# Patient Record
Sex: FEMALE | Race: WHITE | URBAN - METROPOLITAN AREA
[De-identification: names, ages, dates, MRNs, and addresses within clinical notes are randomized per-mention and may not be internally consistent; named-entity substitution may affect disease eponyms.]

---

## 2017-03-31 ENCOUNTER — APPOINTMENT (RX ONLY)
Dept: URBAN - METROPOLITAN AREA CLINIC 29 | Facility: CLINIC | Age: 52
Setting detail: DERMATOLOGY
End: 2017-03-31

## 2017-03-31 DIAGNOSIS — Q826 OTHER SPECIFIED ANOMALIES OF SKIN: ICD-10-CM

## 2017-03-31 DIAGNOSIS — Q828 OTHER SPECIFIED ANOMALIES OF SKIN: ICD-10-CM

## 2017-03-31 DIAGNOSIS — Q819 OTHER SPECIFIED ANOMALIES OF SKIN: ICD-10-CM

## 2017-03-31 DIAGNOSIS — Z71.89 OTHER SPECIFIED COUNSELING: ICD-10-CM

## 2017-03-31 DIAGNOSIS — L71.8 OTHER ROSACEA: ICD-10-CM

## 2017-03-31 DIAGNOSIS — L81.4 OTHER MELANIN HYPERPIGMENTATION: ICD-10-CM

## 2017-03-31 PROBLEM — F32.9 MAJOR DEPRESSIVE DISORDER, SINGLE EPISODE, UNSPECIFIED: Status: ACTIVE | Noted: 2017-03-31

## 2017-03-31 PROBLEM — F41.9 ANXIETY DISORDER, UNSPECIFIED: Status: ACTIVE | Noted: 2017-03-31

## 2017-03-31 PROBLEM — Q82.8 OTHER SPECIFIED CONGENITAL MALFORMATIONS OF SKIN: Status: ACTIVE | Noted: 2017-03-31

## 2017-03-31 PROBLEM — L55.1 SUNBURN OF SECOND DEGREE: Status: ACTIVE | Noted: 2017-03-31

## 2017-03-31 PROCEDURE — 99203 OFFICE O/P NEW LOW 30 MIN: CPT

## 2017-03-31 PROCEDURE — ? TREATMENT REGIMEN

## 2017-03-31 PROCEDURE — ? PRESCRIPTION

## 2017-03-31 PROCEDURE — ? COUNSELING

## 2017-03-31 RX ORDER — SULFACETAMIDE SODIUM 10 MG/ML
1 LOTION TOPICAL BID
Qty: 1 | Refills: 3 | Status: ERX | COMMUNITY
Start: 2017-03-31

## 2017-03-31 RX ADMIN — SULFACETAMIDE SODIUM 1: 10 LOTION TOPICAL at 00:00

## 2017-03-31 ASSESSMENT — LOCATION SIMPLE DESCRIPTION DERM
LOCATION SIMPLE: LEFT HAND
LOCATION SIMPLE: RIGHT CHEEK
LOCATION SIMPLE: RIGHT HAND
LOCATION SIMPLE: RIGHT POSTERIOR UPPER ARM
LOCATION SIMPLE: LEFT POSTERIOR UPPER ARM
LOCATION SIMPLE: LEFT CHEEK

## 2017-03-31 ASSESSMENT — LOCATION DETAILED DESCRIPTION DERM
LOCATION DETAILED: RIGHT DISTAL POSTERIOR UPPER ARM
LOCATION DETAILED: RIGHT MEDIAL MALAR CHEEK
LOCATION DETAILED: LEFT INFERIOR MEDIAL MALAR CHEEK
LOCATION DETAILED: RIGHT RADIAL DORSAL HAND
LOCATION DETAILED: LEFT PROXIMAL POSTERIOR UPPER ARM
LOCATION DETAILED: LEFT RADIAL DORSAL HAND

## 2017-03-31 ASSESSMENT — LOCATION ZONE DERM
LOCATION ZONE: FACE
LOCATION ZONE: ARM
LOCATION ZONE: HAND

## 2017-06-15 ENCOUNTER — APPOINTMENT (RX ONLY)
Dept: URBAN - METROPOLITAN AREA CLINIC 29 | Facility: CLINIC | Age: 52
Setting detail: DERMATOLOGY
End: 2017-06-15

## 2017-06-15 DIAGNOSIS — L30.8 OTHER SPECIFIED DERMATITIS: ICD-10-CM

## 2017-06-15 DIAGNOSIS — L71.8 OTHER ROSACEA: ICD-10-CM

## 2017-06-15 DIAGNOSIS — L44.8 OTHER SPECIFIED PAPULOSQUAMOUS DISORDERS: ICD-10-CM

## 2017-06-15 DIAGNOSIS — L81.0 POSTINFLAMMATORY HYPERPIGMENTATION: ICD-10-CM

## 2017-06-15 PROCEDURE — ? PRESCRIPTION

## 2017-06-15 PROCEDURE — ? OTHER

## 2017-06-15 PROCEDURE — ? COUNSELING

## 2017-06-15 PROCEDURE — 99213 OFFICE O/P EST LOW 20 MIN: CPT

## 2017-06-15 RX ORDER — HYDROCORTISONE 25 MG/G
1 CREAM TOPICAL BID
Qty: 1 | Refills: 3 | Status: ERX | COMMUNITY
Start: 2017-06-15

## 2017-06-15 RX ORDER — AZELAIC ACID 0.15 G/G
1 GEL TOPICAL QHS
Qty: 1 | Refills: 3 | Status: ERX | COMMUNITY
Start: 2017-06-15

## 2017-06-15 RX ADMIN — AZELAIC ACID 1: 0.15 GEL TOPICAL at 14:05

## 2017-06-15 RX ADMIN — HYDROCORTISONE 1: 25 CREAM TOPICAL at 14:00

## 2017-06-15 ASSESSMENT — LOCATION SIMPLE DESCRIPTION DERM
LOCATION SIMPLE: RIGHT KNEE
LOCATION SIMPLE: RIGHT CHEEK
LOCATION SIMPLE: LEFT KNEE
LOCATION SIMPLE: LEFT CHEEK
LOCATION SIMPLE: LEFT POSTERIOR UPPER ARM

## 2017-06-15 ASSESSMENT — LOCATION ZONE DERM
LOCATION ZONE: FACE
LOCATION ZONE: LEG
LOCATION ZONE: ARM

## 2017-06-15 ASSESSMENT — LOCATION DETAILED DESCRIPTION DERM
LOCATION DETAILED: LEFT INFERIOR MEDIAL MALAR CHEEK
LOCATION DETAILED: LEFT DISTAL POSTERIOR UPPER ARM
LOCATION DETAILED: LEFT INFERIOR CENTRAL MALAR CHEEK
LOCATION DETAILED: RIGHT CENTRAL MALAR CHEEK
LOCATION DETAILED: RIGHT KNEE
LOCATION DETAILED: LEFT KNEE

## 2017-06-15 NOTE — PROCEDURE: OTHER
Detail Level: Zone
Note Text (......Xxx Chief Complaint.): This diagnosis correlates with the
Other (Free Text): Advised patient to take a photo if area becomes irritated

## 2018-04-28 ENCOUNTER — HOSPITAL ENCOUNTER (OUTPATIENT)
Facility: MEDICAL CENTER | Age: 53
End: 2018-04-29
Attending: EMERGENCY MEDICINE | Admitting: INTERNAL MEDICINE
Payer: OTHER GOVERNMENT

## 2018-04-28 ENCOUNTER — APPOINTMENT (OUTPATIENT)
Dept: RADIOLOGY | Facility: MEDICAL CENTER | Age: 53
End: 2018-04-28
Attending: EMERGENCY MEDICINE
Payer: OTHER GOVERNMENT

## 2018-04-28 DIAGNOSIS — R47.01 EXPRESSIVE APHASIA: ICD-10-CM

## 2018-04-28 PROBLEM — G45.9 TIA (TRANSIENT ISCHEMIC ATTACK): Status: ACTIVE | Noted: 2018-04-28

## 2018-04-28 PROBLEM — E78.5 HLD (HYPERLIPIDEMIA): Status: ACTIVE | Noted: 2018-04-28

## 2018-04-28 PROBLEM — Z72.0 NICOTINE ABUSE: Status: ACTIVE | Noted: 2018-04-28

## 2018-04-28 PROBLEM — F32.A DEPRESSION: Status: ACTIVE | Noted: 2018-04-28

## 2018-04-28 LAB
ALBUMIN SERPL BCP-MCNC: 4.4 G/DL (ref 3.2–4.9)
ALBUMIN/GLOB SERPL: 1.3 G/DL
ALP SERPL-CCNC: 51 U/L (ref 30–99)
ALT SERPL-CCNC: 9 U/L (ref 2–50)
ANION GAP SERPL CALC-SCNC: 9 MMOL/L (ref 0–11.9)
APTT PPP: 24.6 SEC (ref 24.7–36)
AST SERPL-CCNC: 14 U/L (ref 12–45)
BASOPHILS # BLD AUTO: 0.4 % (ref 0–1.8)
BASOPHILS # BLD: 0.03 K/UL (ref 0–0.12)
BILIRUB SERPL-MCNC: 0.5 MG/DL (ref 0.1–1.5)
BUN SERPL-MCNC: 13 MG/DL (ref 8–22)
CALCIUM SERPL-MCNC: 9.4 MG/DL (ref 8.5–10.5)
CHLORIDE SERPL-SCNC: 101 MMOL/L (ref 96–112)
CHOLEST SERPL-MCNC: 249 MG/DL (ref 100–199)
CO2 SERPL-SCNC: 26 MMOL/L (ref 20–33)
CREAT SERPL-MCNC: 0.82 MG/DL (ref 0.5–1.4)
EKG IMPRESSION: NORMAL
EOSINOPHIL # BLD AUTO: 0.04 K/UL (ref 0–0.51)
EOSINOPHIL NFR BLD: 0.5 % (ref 0–6.9)
ERYTHROCYTE [DISTWIDTH] IN BLOOD BY AUTOMATED COUNT: 40.4 FL (ref 35.9–50)
GLOBULIN SER CALC-MCNC: 3.3 G/DL (ref 1.9–3.5)
GLUCOSE SERPL-MCNC: 126 MG/DL (ref 65–99)
HCT VFR BLD AUTO: 42.6 % (ref 37–47)
HDLC SERPL-MCNC: 91 MG/DL
HGB BLD-MCNC: 14.8 G/DL (ref 12–16)
IMM GRANULOCYTES # BLD AUTO: 0.02 K/UL (ref 0–0.11)
IMM GRANULOCYTES NFR BLD AUTO: 0.3 % (ref 0–0.9)
INR PPP: 0.92 (ref 0.87–1.13)
LDLC SERPL CALC-MCNC: 144 MG/DL
LYMPHOCYTES # BLD AUTO: 2.07 K/UL (ref 1–4.8)
LYMPHOCYTES NFR BLD: 28.2 % (ref 22–41)
MCH RBC QN AUTO: 32 PG (ref 27–33)
MCHC RBC AUTO-ENTMCNC: 34.7 G/DL (ref 33.6–35)
MCV RBC AUTO: 92 FL (ref 81.4–97.8)
MONOCYTES # BLD AUTO: 0.43 K/UL (ref 0–0.85)
MONOCYTES NFR BLD AUTO: 5.9 % (ref 0–13.4)
NEUTROPHILS # BLD AUTO: 4.74 K/UL (ref 2–7.15)
NEUTROPHILS NFR BLD: 64.7 % (ref 44–72)
NRBC # BLD AUTO: 0 K/UL
NRBC BLD-RTO: 0 /100 WBC
PLATELET # BLD AUTO: 277 K/UL (ref 164–446)
PMV BLD AUTO: 10 FL (ref 9–12.9)
POTASSIUM SERPL-SCNC: 3.5 MMOL/L (ref 3.6–5.5)
PROT SERPL-MCNC: 7.7 G/DL (ref 6–8.2)
PROTHROMBIN TIME: 12.1 SEC (ref 12–14.6)
RBC # BLD AUTO: 4.63 M/UL (ref 4.2–5.4)
SODIUM SERPL-SCNC: 136 MMOL/L (ref 135–145)
TRIGL SERPL-MCNC: 72 MG/DL (ref 0–149)
TROPONIN I SERPL-MCNC: <0.01 NG/ML (ref 0–0.04)
TSH SERPL DL<=0.005 MIU/L-ACNC: 1.64 UIU/ML (ref 0.38–5.33)
WBC # BLD AUTO: 7.3 K/UL (ref 4.8–10.8)

## 2018-04-28 PROCEDURE — G0378 HOSPITAL OBSERVATION PER HR: HCPCS

## 2018-04-28 PROCEDURE — 70496 CT ANGIOGRAPHY HEAD: CPT

## 2018-04-28 PROCEDURE — 93306 TTE W/DOPPLER COMPLETE: CPT | Performed by: INTERNAL MEDICINE

## 2018-04-28 PROCEDURE — 99285 EMERGENCY DEPT VISIT HI MDM: CPT

## 2018-04-28 PROCEDURE — 80061 LIPID PANEL: CPT

## 2018-04-28 PROCEDURE — 84484 ASSAY OF TROPONIN QUANT: CPT

## 2018-04-28 PROCEDURE — 70498 CT ANGIOGRAPHY NECK: CPT

## 2018-04-28 PROCEDURE — 85730 THROMBOPLASTIN TIME PARTIAL: CPT

## 2018-04-28 PROCEDURE — 99220 PR INITIAL OBSERVATION CARE,LEVL III: CPT | Performed by: INTERNAL MEDICINE

## 2018-04-28 PROCEDURE — 0042T CT-CEREBRAL PERFUSION ANALYSIS: CPT

## 2018-04-28 PROCEDURE — 93005 ELECTROCARDIOGRAM TRACING: CPT | Performed by: EMERGENCY MEDICINE

## 2018-04-28 PROCEDURE — 83036 HEMOGLOBIN GLYCOSYLATED A1C: CPT

## 2018-04-28 PROCEDURE — 85610 PROTHROMBIN TIME: CPT

## 2018-04-28 PROCEDURE — 70450 CT HEAD/BRAIN W/O DYE: CPT

## 2018-04-28 PROCEDURE — 700117 HCHG RX CONTRAST REV CODE 255: Performed by: EMERGENCY MEDICINE

## 2018-04-28 PROCEDURE — 85025 COMPLETE CBC W/AUTO DIFF WBC: CPT

## 2018-04-28 PROCEDURE — 80053 COMPREHEN METABOLIC PANEL: CPT

## 2018-04-28 PROCEDURE — 84443 ASSAY THYROID STIM HORMONE: CPT

## 2018-04-28 RX ORDER — ACETAMINOPHEN 325 MG/1
650 TABLET ORAL EVERY 6 HOURS PRN
Status: DISCONTINUED | OUTPATIENT
Start: 2018-04-28 | End: 2018-04-29 | Stop reason: HOSPADM

## 2018-04-28 RX ORDER — ASPIRIN 325 MG
325 TABLET ORAL DAILY
Status: DISCONTINUED | OUTPATIENT
Start: 2018-04-28 | End: 2018-04-29 | Stop reason: HOSPADM

## 2018-04-28 RX ORDER — POLYETHYLENE GLYCOL 3350 17 G/17G
1 POWDER, FOR SOLUTION ORAL
Status: DISCONTINUED | OUTPATIENT
Start: 2018-04-28 | End: 2018-04-29 | Stop reason: HOSPADM

## 2018-04-28 RX ORDER — PROMETHAZINE HYDROCHLORIDE 25 MG/1
12.5-25 SUPPOSITORY RECTAL EVERY 4 HOURS PRN
Status: DISCONTINUED | OUTPATIENT
Start: 2018-04-28 | End: 2018-04-29 | Stop reason: HOSPADM

## 2018-04-28 RX ORDER — ASPIRIN 300 MG/1
300 SUPPOSITORY RECTAL DAILY
Status: DISCONTINUED | OUTPATIENT
Start: 2018-04-28 | End: 2018-04-29 | Stop reason: HOSPADM

## 2018-04-28 RX ORDER — PROMETHAZINE HYDROCHLORIDE 25 MG/1
12.5-25 TABLET ORAL EVERY 4 HOURS PRN
Status: DISCONTINUED | OUTPATIENT
Start: 2018-04-28 | End: 2018-04-29 | Stop reason: HOSPADM

## 2018-04-28 RX ORDER — ONDANSETRON 2 MG/ML
4 INJECTION INTRAMUSCULAR; INTRAVENOUS EVERY 4 HOURS PRN
Status: DISCONTINUED | OUTPATIENT
Start: 2018-04-28 | End: 2018-04-29 | Stop reason: HOSPADM

## 2018-04-28 RX ORDER — ONDANSETRON 4 MG/1
4 TABLET, ORALLY DISINTEGRATING ORAL EVERY 4 HOURS PRN
Status: DISCONTINUED | OUTPATIENT
Start: 2018-04-28 | End: 2018-04-29 | Stop reason: HOSPADM

## 2018-04-28 RX ORDER — BISACODYL 10 MG
10 SUPPOSITORY, RECTAL RECTAL
Status: DISCONTINUED | OUTPATIENT
Start: 2018-04-28 | End: 2018-04-29 | Stop reason: HOSPADM

## 2018-04-28 RX ORDER — MONTELUKAST SODIUM 4 MG/1
2 TABLET, CHEWABLE ORAL EVERY EVENING
COMMUNITY

## 2018-04-28 RX ORDER — HYDRALAZINE HYDROCHLORIDE 20 MG/ML
10 INJECTION INTRAMUSCULAR; INTRAVENOUS
Status: DISCONTINUED | OUTPATIENT
Start: 2018-04-28 | End: 2018-04-29 | Stop reason: HOSPADM

## 2018-04-28 RX ORDER — ESCITALOPRAM OXALATE 10 MG/1
5 TABLET ORAL EVERY EVENING
COMMUNITY

## 2018-04-28 RX ORDER — ALPRAZOLAM 0.5 MG/1
0.5 TABLET ORAL NIGHTLY PRN
COMMUNITY

## 2018-04-28 RX ORDER — AMOXICILLIN 250 MG
2 CAPSULE ORAL 2 TIMES DAILY
Status: DISCONTINUED | OUTPATIENT
Start: 2018-04-28 | End: 2018-04-29 | Stop reason: HOSPADM

## 2018-04-28 RX ORDER — FEXOFENADINE HCL 60 MG/1
120 TABLET, FILM COATED ORAL 2 TIMES DAILY
COMMUNITY

## 2018-04-28 RX ORDER — ATORVASTATIN CALCIUM 80 MG/1
80 TABLET, FILM COATED ORAL EVERY EVENING
Status: DISCONTINUED | OUTPATIENT
Start: 2018-04-28 | End: 2018-04-29 | Stop reason: HOSPADM

## 2018-04-28 RX ORDER — SULFAMETHOXAZOLE AND TRIMETHOPRIM 800; 160 MG/1; MG/1
1 TABLET ORAL 2 TIMES DAILY
COMMUNITY

## 2018-04-28 RX ORDER — ASPIRIN 81 MG/1
324 TABLET, CHEWABLE ORAL DAILY
Status: DISCONTINUED | OUTPATIENT
Start: 2018-04-28 | End: 2018-04-29 | Stop reason: HOSPADM

## 2018-04-28 RX ORDER — LABETALOL HYDROCHLORIDE 5 MG/ML
10 INJECTION, SOLUTION INTRAVENOUS EVERY 4 HOURS PRN
Status: DISCONTINUED | OUTPATIENT
Start: 2018-04-28 | End: 2018-04-29 | Stop reason: HOSPADM

## 2018-04-28 RX ADMIN — IOHEXOL 100 ML: 350 INJECTION, SOLUTION INTRAVENOUS at 21:40

## 2018-04-28 ASSESSMENT — LIFESTYLE VARIABLES
ON A TYPICAL DAY WHEN YOU DRINK ALCOHOL HOW MANY DRINKS DO YOU HAVE: 1
EVER HAD A DRINK FIRST THING IN THE MORNING TO STEADY YOUR NERVES TO GET RID OF A HANGOVER: NO
CONSUMPTION TOTAL: NEGATIVE
AVERAGE NUMBER OF DAYS PER WEEK YOU HAVE A DRINK CONTAINING ALCOHOL: 2
DO YOU DRINK ALCOHOL: YES
EVER FELT BAD OR GUILTY ABOUT YOUR DRINKING: NO
TOTAL SCORE: 0
HOW MANY TIMES IN THE PAST YEAR HAVE YOU HAD 5 OR MORE DRINKS IN A DAY: 0
HAVE YOU EVER FELT YOU SHOULD CUT DOWN ON YOUR DRINKING: NO
TOTAL SCORE: 0
EVER_SMOKED: YES
TOTAL SCORE: 0
HAVE PEOPLE ANNOYED YOU BY CRITICIZING YOUR DRINKING: NO

## 2018-04-28 ASSESSMENT — COPD QUESTIONNAIRES
COPD SCREENING SCORE: 3
HAVE YOU SMOKED AT LEAST 100 CIGARETTES IN YOUR ENTIRE LIFE: YES
DO YOU EVER COUGH UP ANY MUCUS OR PHLEGM?: NO/ONLY WITH OCCASIONAL COLDS OR INFECTIONS
DURING THE PAST 4 WEEKS HOW MUCH DID YOU FEEL SHORT OF BREATH: NONE/LITTLE OF THE TIME

## 2018-04-28 ASSESSMENT — ENCOUNTER SYMPTOMS
HEADACHES: 1
COUGH: 0
HEARTBURN: 0
NAUSEA: 0
LOSS OF CONSCIOUSNESS: 0
VOMITING: 0
CHILLS: 0
DIZZINESS: 0
SENSORY CHANGE: 0
FOCAL WEAKNESS: 0
FEVER: 0
SPEECH CHANGE: 1
SHORTNESS OF BREATH: 0

## 2018-04-29 ENCOUNTER — APPOINTMENT (OUTPATIENT)
Dept: RADIOLOGY | Facility: MEDICAL CENTER | Age: 53
End: 2018-04-29
Attending: INTERNAL MEDICINE
Payer: OTHER GOVERNMENT

## 2018-04-29 VITALS
HEIGHT: 62 IN | OXYGEN SATURATION: 99 % | BODY MASS INDEX: 21.1 KG/M2 | RESPIRATION RATE: 13 BRPM | SYSTOLIC BLOOD PRESSURE: 103 MMHG | DIASTOLIC BLOOD PRESSURE: 60 MMHG | TEMPERATURE: 99 F | WEIGHT: 114.64 LBS | HEART RATE: 70 BPM

## 2018-04-29 PROBLEM — G45.9 TIA (TRANSIENT ISCHEMIC ATTACK): Status: RESOLVED | Noted: 2018-04-28 | Resolved: 2018-04-29

## 2018-04-29 LAB
EST. AVERAGE GLUCOSE BLD GHB EST-MCNC: 97 MG/DL
HBA1C MFR BLD: 5 % (ref 0–5.6)
LV EJECT FRACT  99904: 60
LV EJECT FRACT MOD 2C 99903: 67.83
LV EJECT FRACT MOD 4C 99902: 65.82
LV EJECT FRACT MOD BP 99901: 63.11

## 2018-04-29 PROCEDURE — 70551 MRI BRAIN STEM W/O DYE: CPT

## 2018-04-29 PROCEDURE — 93306 TTE W/DOPPLER COMPLETE: CPT

## 2018-04-29 PROCEDURE — G8980 MOBILITY D/C STATUS: HCPCS | Mod: CH

## 2018-04-29 PROCEDURE — 306588 SLEEVE,VASO CALF MED: Performed by: INTERNAL MEDICINE

## 2018-04-29 PROCEDURE — G0378 HOSPITAL OBSERVATION PER HR: HCPCS

## 2018-04-29 PROCEDURE — A9270 NON-COVERED ITEM OR SERVICE: HCPCS | Performed by: INTERNAL MEDICINE

## 2018-04-29 PROCEDURE — G8979 MOBILITY GOAL STATUS: HCPCS | Mod: CH

## 2018-04-29 PROCEDURE — 99217 PR OBSERVATION CARE DISCHARGE: CPT | Performed by: HOSPITALIST

## 2018-04-29 PROCEDURE — 700102 HCHG RX REV CODE 250 W/ 637 OVERRIDE(OP): Performed by: INTERNAL MEDICINE

## 2018-04-29 PROCEDURE — G8978 MOBILITY CURRENT STATUS: HCPCS | Mod: CH

## 2018-04-29 PROCEDURE — 97161 PT EVAL LOW COMPLEX 20 MIN: CPT

## 2018-04-29 RX ADMIN — ATORVASTATIN CALCIUM 80 MG: 80 TABLET, FILM COATED ORAL at 01:45

## 2018-04-29 RX ADMIN — ASPIRIN 81 MG: 81 TABLET, CHEWABLE ORAL at 02:42

## 2018-04-29 RX ADMIN — ASPIRIN 243 MG: 81 TABLET, CHEWABLE ORAL at 01:45

## 2018-04-29 ASSESSMENT — PAIN SCALES - GENERAL
PAINLEVEL_OUTOF10: 0

## 2018-04-29 ASSESSMENT — GAIT ASSESSMENTS
GAIT LEVEL OF ASSIST: SUPERVISED
DISTANCE (FEET): 500

## 2018-04-29 ASSESSMENT — LIFESTYLE VARIABLES
TOTAL SCORE: 0
HAVE PEOPLE ANNOYED YOU BY CRITICIZING YOUR DRINKING: NO
AVERAGE NUMBER OF DAYS PER WEEK YOU HAVE A DRINK CONTAINING ALCOHOL: 1
EVER FELT BAD OR GUILTY ABOUT YOUR DRINKING: NO
TOTAL SCORE: 0
HAVE YOU EVER FELT YOU SHOULD CUT DOWN ON YOUR DRINKING: NO
TOTAL SCORE: 0
EVER HAD A DRINK FIRST THING IN THE MORNING TO STEADY YOUR NERVES TO GET RID OF A HANGOVER: NO
ON A TYPICAL DAY WHEN YOU DRINK ALCOHOL HOW MANY DRINKS DO YOU HAVE: 2
CONSUMPTION TOTAL: NEGATIVE
HOW MANY TIMES IN THE PAST YEAR HAVE YOU HAD 5 OR MORE DRINKS IN A DAY: 0

## 2018-04-29 ASSESSMENT — COGNITIVE AND FUNCTIONAL STATUS - GENERAL
SUGGESTED CMS G CODE MODIFIER MOBILITY: CH
MOBILITY SCORE: 24

## 2018-04-29 ASSESSMENT — PATIENT HEALTH QUESTIONNAIRE - PHQ9
2. FEELING DOWN, DEPRESSED, IRRITABLE, OR HOPELESS: NOT AT ALL
SUM OF ALL RESPONSES TO PHQ9 QUESTIONS 1 AND 2: 0
1. LITTLE INTEREST OR PLEASURE IN DOING THINGS: NOT AT ALL

## 2018-04-29 NOTE — PROGRESS NOTES
IV Dc 'd. Discharge instructions provided to patient. Pt verbalizes understanding. Pt states all questions have been answered. Copy of DC provided to patient. Signed copy in chart. Pt states all personal belongings are in possession.  Pt escorted off unit by tech without incident.

## 2018-04-29 NOTE — ED PROVIDER NOTES
ED Provider Note    CHIEF COMPLAINT  Chief Complaint   Patient presents with   • Inability To Get Words Out     Pt. states that approximately 30 minutes ago she began to have difficulty formulating sentences and having word formation. Pt. is talking in full and complete sentences in triage. Pt. has equal strength and  bilaterally in triage. Pt. is anxious in triage. Pt. has 3mm oval and reactive right eye and 2mm oval and reactive left eye. Pt. states family hx of brain cancer with similar symptoms occuring in family members prior to dx.        HPI  Elise Pozo is a 52 y.o. female who presents for evaluation of difficulty speaking of acute onset at 7:30 PM, proximal and one hour prior to evaluation here in the emergency department. The patient reports difficulty finding an formulating words, she states she is having difficulty putting her thoughts into words and is aware of what's happening. The family reports that her symptoms at various times over the past hour were very severe, at this time they seem much less severe. She has no weakness, numbness or tingling of any extremity, no headache but feels a bizarre sensation behind her left eye. No visual changes. No chest pain, no back pain, no other complaints, no medical problems.    REVIEW OF SYSTEMS  Negative for fever, rash, chest pain, dyspnea, abdominal pain, nausea, vomiting, diarrhea, headache, focal weakness, focal numbness, focal tingling, back pain. All other systems are negative.     PAST MEDICAL HISTORY  History reviewed. No pertinent past medical history.    FAMILY HISTORY  History reviewed. No pertinent family history.    SOCIAL HISTORY  Social History   Substance Use Topics   • Smoking status: Former Smoker   • Smokeless tobacco: Never Used   • Alcohol use Yes       SURGICAL HISTORY  Past Surgical History:   Procedure Laterality Date   • GYN SURGERY         CURRENT MEDICATIONS  I personally reviewed the medication list in the charting  "documentation.     ALLERGIES  Allergies   Allergen Reactions   • Demerol Anaphylaxis   • Morphine Anaphylaxis   • Ciprofloxacin Rash     red   • Clindamycin    • Pcn [Penicillins] Rash     Red         MEDICAL RECORD  I have reviewed patient's medical record and pertinent results are listed above.      PHYSICAL EXAM  VITAL SIGNS: /82   Pulse 73   Temp 37.1 °C (98.7 °F)   Resp 16   Ht 1.575 m (5' 2\")   Wt 52 kg (114 lb 10.2 oz)   SpO2 99%   BMI 20.97 kg/m²    Constitutional: Well appearing patient in no acute distress.  Not toxic, nor ill in appearance.  HENT: Mucus membranes moist.    Eyes: No scleral icterus. Normal conjunctiva   Neck: Supple, comfortable, nonpainful range of motion.   Cardiovascular: Regular heart rate and rhythm.   Thorax & Lungs: Chest is nontender.  Lungs are clear to auscultation with good air movement bilaterally.  No wheeze, rhonchi, nor rales.   Abdomen: Soft, with no tenderness, rebound nor guarding.  No mass or pulsatile mass appreciated.  Skin: Warm, dry. No rash appreciated  Extremities/Musculoskeletal: No sign of trauma. No asymmetric calf tenderness, erythema or edema. Normal range of motion   Neurologic: AAOx4, Cranial nerves II-XII grossly intact, PERRLA, EOMI, evaluation of her speech reveals intermittent word finding difficulties, otherwise his speech is normal, normal and symmetric motor and sensory functions upper and lower extremities bilaterally  Psychiatric: Normal affect appropriate for the clinical situation.    DIAGNOSTIC STUDIES / PROCEDURES    EKG  12 Lead EKG interpreted by me to show:    Rate 68  Rhythm: Normal sinus rhythm  Axis: Normal  FL and QRS Intervals: Normal  T waves: No acute changes  ST segments: No acute changes  Ectopy: None.    My impression of this EKG: Does not indicate ischemia or arrythmia at this time.      LABS  Results for orders placed or performed during the hospital encounter of 04/28/18   CBC WITH DIFFERENTIAL   Result Value Ref " Range    WBC 7.3 4.8 - 10.8 K/uL    RBC 4.63 4.20 - 5.40 M/uL    Hemoglobin 14.8 12.0 - 16.0 g/dL    Hematocrit 42.6 37.0 - 47.0 %    MCV 92.0 81.4 - 97.8 fL    MCH 32.0 27.0 - 33.0 pg    MCHC 34.7 33.6 - 35.0 g/dL    RDW 40.4 35.9 - 50.0 fL    Platelet Count 277 164 - 446 K/uL    MPV 10.0 9.0 - 12.9 fL    Neutrophils-Polys 64.70 44.00 - 72.00 %    Lymphocytes 28.20 22.00 - 41.00 %    Monocytes 5.90 0.00 - 13.40 %    Eosinophils 0.50 0.00 - 6.90 %    Basophils 0.40 0.00 - 1.80 %    Immature Granulocytes 0.30 0.00 - 0.90 %    Nucleated RBC 0.00 /100 WBC    Neutrophils (Absolute) 4.74 2.00 - 7.15 K/uL    Lymphs (Absolute) 2.07 1.00 - 4.80 K/uL    Monos (Absolute) 0.43 0.00 - 0.85 K/uL    Eos (Absolute) 0.04 0.00 - 0.51 K/uL    Baso (Absolute) 0.03 0.00 - 0.12 K/uL    Immature Granulocytes (abs) 0.02 0.00 - 0.11 K/uL    NRBC (Absolute) 0.00 K/uL   COMP METABOLIC PANEL   Result Value Ref Range    Sodium 136 135 - 145 mmol/L    Potassium 3.5 (L) 3.6 - 5.5 mmol/L    Chloride 101 96 - 112 mmol/L    Co2 26 20 - 33 mmol/L    Anion Gap 9.0 0.0 - 11.9    Glucose 126 (H) 65 - 99 mg/dL    Bun 13 8 - 22 mg/dL    Creatinine 0.82 0.50 - 1.40 mg/dL    Calcium 9.4 8.5 - 10.5 mg/dL    AST(SGOT) 14 12 - 45 U/L    ALT(SGPT) 9 2 - 50 U/L    Alkaline Phosphatase 51 30 - 99 U/L    Total Bilirubin 0.5 0.1 - 1.5 mg/dL    Albumin 4.4 3.2 - 4.9 g/dL    Total Protein 7.7 6.0 - 8.2 g/dL    Globulin 3.3 1.9 - 3.5 g/dL    A-G Ratio 1.3 g/dL   TROPONIN   Result Value Ref Range    Troponin I <0.01 0.00 - 0.04 ng/mL   PROTHROMBIN TIME   Result Value Ref Range    PT 12.1 12.0 - 14.6 sec    INR 0.92 0.87 - 1.13   APTT   Result Value Ref Range    APTT 24.6 (L) 24.7 - 36.0 sec   ESTIMATED GFR   Result Value Ref Range    GFR If African American >60 >60 mL/min/1.73 m 2    GFR If Non African American >60 >60 mL/min/1.73 m 2   EKG (NOW)   Result Value Ref Range    Report       [facility] Emergency Dept.    Test Date:  2018-04-28  Pt Name:     SHELIA ENGEL                  Department: ER  MRN:        [id number]                      Room:       RD 09  Gender:     Female                       Technician: [id number]  :        1965                   Requested By:DAVID CASIANO  Order #:    [id number]                    Reading MD:    Measurements  Intervals                                Axis  Rate:       68                           P:          74  MA:         144                          QRS:        75  QRSD:       86                           T:          54  QT:         432  QTc:        460    Interpretive Statements  SINUS RHYTHM  No previous ECG available for comparison           RADIOLOGY  CT-CTA NECK WITH & W/O-POST PROCESSING   Final Result      No focal stenosis, dissection or occlusion of the cervical carotid or vertebral arteries.      CT-CTA HEAD WITH & W/O-POST PROCESS   Final Result      No intracranial aneurysm, focal stenosis or abrupt large vessel cut off.      CT-CEREBRAL PERFUSION ANALYSIS   Final Result      Nondiagnostic CT perfusion study due to significant patient motion during the scan.      CT-HEAD W/O   Final Result      1.  No acute intracranial abnormality.   2.  Chronic LEFT maxillary sinus disease.            COURSE & MEDICAL DECISION MAKING  I have reviewed any medical record information, laboratory studies and radiographic results as noted above.  Differential diagnoses includes: TIA versus CVA, intracranial mass, or joint abnormalities    Encounter Summary: This is a 52 y.o. female with acute onset of expressive aphasia that has been basically waxing and waning in severity, upon her initial arrival in emergency department the patient had normal speech therefore had a normal NIH score was not made a stroke alert. Upon my evaluation her speech difficulty was intermittently present, the rest of her neurologic examination is totally normal. Her EKG is normal, her vital signs are normal. Will check CT of the head, CTA head and  neck as well as a perfusion study given the timeframe since onset of symptoms, will consult neurology. ------- discussed the case with the teleneurologist, his recommendation was to hold off on alteplase right now because her NIH stroke scale is 0 therefore she is not on alteplase candidate currently, however if at any point her NIH stroke scale were to increase beyond 1 within the 4-1/2 hour window then we are to administer alteplase. I relayed this information to the hospitalist. This point patient is being admitted to the hospital in guarded condition for further evaluation      DISPOSITION: Admitted in guarded condition      FINAL IMPRESSION  1. Expressive aphasia           This dictation was created using voice recognition software. The accuracy of the dictation is limited to the abilities of the software. I expect there may be some errors of grammar and possibly content. The nursing notes were reviewed and certain aspects of this information were incorporated into this note.    Electronically signed by: Dean Berkowitz, 4/28/2018 8:49 PM

## 2018-04-29 NOTE — ASSESSMENT & PLAN NOTE
Patient reports smoking a cigarettes and chewing nicotine gums  Has been counseled on cessation of e cigarettes use

## 2018-04-29 NOTE — ASSESSMENT & PLAN NOTE
Expressive aphasia and slurring of speech that started at 7:30 PM and have now completely resolved  CT head and CTA head and neck negative for acute process  Patient will be placed on continuous cardiac monitoring to evaluate for any arrhythmias  I will order MRI brain and 2-D echo for further evaluation  Check TSH, lipid panel and hemoglobin A1c  Patient has been started on full dose aspirin and high intensity statin  Consider discontinuing Premarin which may increase the risk of stroke and DVT  PT, OT and ST evaluation

## 2018-04-29 NOTE — ED TRIAGE NOTES
"Elise Daphne Pozo  52 y.o. female  Chief Complaint   Patient presents with   • Inability To Get Words Out     Pt. states that approximately 30 minutes ago she began to have difficulty formulating sentences and having word formation. Pt. is talking in full and complete sentences in triage. Pt. has equal strength and  bilaterally in triage. Pt. is anxious in triage. Pt. has 3mm oval and reactive right eye and 2mm oval and reactive left eye. Pt. states family hx of brain cancer with similar symptoms occuring in family members prior to dx.      /82   Pulse 73   Temp 37.1 °C (98.7 °F)   Resp 16   Ht 1.575 m (5' 2\")   Wt 52 kg (114 lb 10.2 oz)   SpO2 99%   BMI 20.97 kg/m²     Pt amb to triage with steady gait for above complaint. Pt. To Red 9 from triage.    "

## 2018-04-29 NOTE — DISCHARGE INSTRUCTIONS
Discharge Instructions    Discharged to home by car with relative. Discharged via walking, hospital escort: Refused.  Special equipment needed: Not Applicable    Be sure to schedule a follow-up appointment with your primary care doctor or any specialists as instructed.     Discharge Plan:   Diet Plan: Discussed  Activity Level: Discussed  Confirmed Follow up Appointment: Patient to Call and Schedule Appointment  Confirmed Symptoms Management: Discussed  Medication Reconciliation Updated: Yes  Influenza Vaccine Indication: Not indicated: Previously immunized this influenza season and > 8 years of age    I understand that a diet low in cholesterol, fat, and sodium is recommended for good health. Unless I have been given specific instructions below for another diet, I accept this instruction as my diet prescription.   Other diet: heart healthy    Special Instructions: None    · Is patient discharged on Warfarin / Coumadin?   No       Stop smoking. Follow up w your regular provider to discuss lipids and baby aspirin.            Depression / Suicide Risk    As you are discharged from this [facility] facility, it is important to learn how to keep safe from harming yourself.    Recognize the warning signs:  · Abrupt changes in personality, positive or negative- including increase in energy   · Giving away possessions  · Change in eating patterns- significant weight changes-  positive or negative  · Change in sleeping patterns- unable to sleep or sleeping all the time   · Unwillingness or inability to communicate  · Depression  · Unusual sadness, discouragement and loneliness  · Talk of wanting to die  · Neglect of personal appearance   · Rebelliousness- reckless behavior  · Withdrawal from people/activities they love  · Confusion- inability to concentrate     If you or a loved one observes any of these behaviors or has concerns about self-harm, here's what you can do:  · Talk about it- your feelings and reasons for  harming yourself  · Remove any means that you might use to hurt yourself (examples: pills, rope, extension cords, firearm)  · Get professional help from the community (Mental Health, Substance Abuse, psychological counseling)  · Do not be alone:Call your Safe Contact- someone whom you trust who will be there for you.  · Call your local CRISIS HOTLINE 007-2759 or 774-048-4305  · Call your local Children's Mobile Crisis Response Team [place] (988) 829-5091 or www.SessionM  · Call the toll free National Suicide Prevention Hotlines   · National Suicide Prevention Lifeline 381-795-OZKS (8936)  · National Hope Line Network 800-SUICIDE (020-0334)

## 2018-04-29 NOTE — PROGRESS NOTES
Answered pts call light.  Pt ambulated to restroom and back to bed with steady gait.  Pt voided well.  Call light within reach, no other needs at this time.

## 2018-04-29 NOTE — DISCHARGE SUMMARY
CHIEF COMPLAINT ON ADMISSION  Chief Complaint   Patient presents with   • Inability To Get Words Out     Pt. states that approximately 30 minutes ago she began to have difficulty formulating sentences and having word formation. Pt. is talking in full and complete sentences in triage. Pt. has equal strength and  bilaterally in triage. Pt. is anxious in triage. Pt. has 3mm oval and reactive right eye and 2mm oval and reactive left eye. Pt. states family hx of brain cancer with similar symptoms occuring in family members prior to dx.        CODE STATUS  Full Code    HPI & HOSPITAL COURSE  This is a 52 y.o. female with past medical history E cigarette use, estrogen replacement, and hyperlipidemia here with sudden onset aphasia. Please see the dictated history of present illness April 28 by Dr. Jose Ramon Marin for complete details. Her symptoms resolved spontaneously by the time he was evaluated in the emergency room.      In short the patient was admitted to the observation unit for further workup. Initial CT scan of the head without contrast in the emergency room was negative for acute pathology. She underwent CT angiogram of the head and neck along with cerebral perfusion, which were all negative for acute pathology. Subsequent MRI of the brain without contrast was without acute pathology. Echocardiogram was normal. EKG was unremarkable. Laboratory evaluation including CBC, and chemistry panel, lipid panel, troponin, regular exercise, and TSH were unremarkable except for the following abnormalities: Potassium 3.5, glucose 126, total cholesterol 249, . Throughout  her time in the observation unit, her exam remained nonfocal and her vital signs remained stable and within normal limits.    The patient was counseled on the importance of tobacco cessation, low-cholesterol diet, regular exercise. At present her symptoms are most consistent with TIA. Due to her young age and marked HDL at 91, I'll defer further  management of her hypercholesterolemia to her primary care physician. Similarly she should pursue a discussion in outpatient setting regarding the potential benefits and costs of daily baby aspirin therapy. Having a TIA does increase her risk of subsequent stroke in the future.    The patient recovered much more quickly than anticipated on admission.    Therefore, she is discharged in good and stable condition with close outpatient follow-up.    SPECIFIC OUTPATIENT FOLLOW-UP  PCP    DISCHARGE PROBLEM LIST  Active Problems:    HLD (hyperlipidemia) POA: Yes    Nicotine abuse POA: Yes    Depression POA: Yes  Resolved Problems:    TIA (transient ischemic attack) POA: Yes      FOLLOW UP  No future appointments.  No follow-up provider specified.    MEDICATIONS ON DISCHARGE   Elise Pozo   Home Medication Instructions BRYANT:[id number]    Printed on:04/29/18 1413   Medication Information                      ALPRAZolam (XANAX) 0.5 MG Tab  Take 0.5 mg by mouth at bedtime as needed for Sleep.             colestipol (COLESTID) 1 GM Tab  Take 2 g by mouth every evening.             conjugated estrogen (PREMARIN) 0.625 MG Tab  Take 0.625 mg by mouth every day.             escitalopram (LEXAPRO) 10 MG Tab  Take 10 mg by mouth every evening.             fexofenadine (ALLEGRA) 60 MG Tab  Take 120 mg by mouth 2 times a day.             sulfamethoxazole-trimethoprim (BACTRIM DS) 800-160 MG tablet  Take 1 Tab by mouth 2 times a day. Second 7 day course for a staph infection, per pt. Finished >2 weeks ago, reported 4/28/18                 DIET  Orders Placed This Encounter   Procedures   • DIET ORDER     Standing Status:   Standing     Number of Occurrences:   1     Order Specific Question:   Diet:     Answer:   2 Gram Sodium [7]     Order Specific Question:   Macronutrient modifications:     Answer:   Low Cholesterol [7]   • DISCONTINUE DIET TRAY     Standing Status:   Standing     Number of Occurrences:   1       ACTIVITY  As  tolerated.  Weight bearing as tolerated      CONSULTATIONS  None    PROCEDURES  None    LABORATORY  Lab Results   Component Value Date/Time    SODIUM 136 04/28/2018 08:50 PM    POTASSIUM 3.5 (L) 04/28/2018 08:50 PM    CHLORIDE 101 04/28/2018 08:50 PM    CO2 26 04/28/2018 08:50 PM    GLUCOSE 126 (H) 04/28/2018 08:50 PM    BUN 13 04/28/2018 08:50 PM    CREATININE 0.82 04/28/2018 08:50 PM        Lab Results   Component Value Date/Time    WBC 7.3 04/28/2018 08:50 PM    HEMOGLOBIN 14.8 04/28/2018 08:50 PM    HEMATOCRIT 42.6 04/28/2018 08:50 PM    PLATELETCT 277 04/28/2018 08:50 PM        Total time of the discharge process exceeds 32 minutes

## 2018-04-29 NOTE — PROGRESS NOTES
Assessment completed. Pt A&Ox4. Respirations are even and unlabored on RA. Pt denies pain at this time. Neuro intact. Monitors applied, VS stable, call light and belongings within reach. POC updated. Pt educated on room and call light, pt verbalized understanding. Communication board updated. Needs met.  at bedside.

## 2018-04-29 NOTE — PROGRESS NOTES
A/o,assessment completed,hooked to the monitor,poc discussed,verbalized understanding,denies pain,sob,call button with reach,will continue to monitor.

## 2018-04-29 NOTE — ED NOTES
Med rec complete per pt at bedside  Allergies reviewed   Pt reports she was on Bactrim twice recently for a staph infection  Took for 7 days, then off for 7 days, then took for another 7 days. Last course finished >2 weeks ago

## 2018-04-29 NOTE — H&P
Hospital Medicine History and Physical    Date of Service  4/28/2018    Chief Complaint  Chief Complaint   Patient presents with   • Inability To Get Words Out     Pt. states that approximately 30 minutes ago she began to have difficulty formulating sentences and having word formation. Pt. is talking in full and complete sentences in triage. Pt. has equal strength and  bilaterally in triage. Pt. is anxious in triage. Pt. has 3mm oval and reactive right eye and 2mm oval and reactive left eye. Pt. states family hx of brain cancer with similar symptoms occuring in family members prior to dx.        History of Presenting Illness  52 y.o. female with a past medical history of hyperlipidemia, use of conjugated estrogen and E-cigarette use who presented 4/28/2018 with sudden onset of aphasia at 7:30 PM. Patient reported inability to form sentences and then started speaking gibberish as witnessed by her . She also reported left frontal headache. Her symptoms were intermittent. At the time of her Henrry her symptoms had completely resolved. At this time she denies any headache, numbness, focal weakness, vision changes, slurred speech, fever, chest pain or shortness of breath. She reports a family history of stroke in her grandfather. She endorses using E cigarettes and nicotine gums.      Primary Care Physician  No primary care provider on file.    Consultants  None    Code Status  Full Code    Review of Systems  Review of Systems   Constitutional: Negative for chills, fever and malaise/fatigue.   Respiratory: Negative for cough and shortness of breath.    Cardiovascular: Negative for chest pain.   Gastrointestinal: Negative for heartburn, nausea and vomiting.   Genitourinary: Negative for dysuria.   Neurological: Positive for speech change and headaches. Negative for dizziness, sensory change, focal weakness and loss of consciousness.   All other systems reviewed and are negative.       Past Medical  History  Hyperlipidemia  Use of E cigarettes      Surgical History  Past Surgical History:   Procedure Laterality Date   • GYN SURGERY         Medications  No current facility-administered medications on file prior to encounter.      No current outpatient prescriptions on file prior to encounter.     Lexapro 10 mg every evening   Xanax 0.5 mg at bedtime   Allegra 120 mg twice a day   Premarin 0.625 mg every day   Colestipol 2 g by mouth every evening     Family History  History reviewed. No pertinent family history.    Social History  Social History   Substance Use Topics   • Smoking status: Former Smoker   • Smokeless tobacco: Never Used   • Alcohol use Yes       Allergies  Allergies   Allergen Reactions   • Demerol Anaphylaxis   • Morphine Anaphylaxis   • Ciprofloxacin Rash     red   • Clindamycin    • Pcn [Penicillins] Rash     Red          Physical Exam  Laboratory   Hemodynamics  Temp (24hrs), Av.1 °C (98.7 °F), Min:37.1 °C (98.7 °F), Max:37.1 °C (98.7 °F)   Temperature: 37.1 °C (98.7 °F)  Pulse  Av.3  Min: 69  Max: 76 Heart Rate (Monitored): 74  Blood Pressure: 121/74, NIBP: 100/64      Respiratory      Respiration: 16, Pulse Oximetry: 98 %             Physical Exam   Constitutional: She is oriented to person, place, and time. She appears well-developed and well-nourished. No distress.   HENT:   Head: Normocephalic and atraumatic.   Mouth/Throat: Oropharynx is clear and moist.   Eyes: Conjunctivae are normal. Pupils are equal, round, and reactive to light.   Neck: Neck supple.   Cardiovascular: Normal rate, regular rhythm and normal heart sounds.    Pulmonary/Chest: Effort normal and breath sounds normal. No respiratory distress. She has no wheezes. She has no rales.   Abdominal: Soft. Bowel sounds are normal. She exhibits no distension. There is no tenderness. There is no rebound.   Musculoskeletal: Normal range of motion. She exhibits no edema or tenderness.   Neurological: She is alert and oriented  to person, place, and time. No cranial nerve deficit. Coordination normal.   Skin: Skin is warm and dry.   Psychiatric: She has a normal mood and affect. Her behavior is normal.   Nursing note and vitals reviewed.      Recent Labs      04/28/18 2050   WBC  7.3   RBC  4.63   HEMOGLOBIN  14.8   HEMATOCRIT  42.6   MCV  92.0   MCH  32.0   MCHC  34.7   RDW  40.4   PLATELETCT  277   MPV  10.0     Recent Labs      04/28/18 2050   SODIUM  136   POTASSIUM  3.5*   CHLORIDE  101   CO2  26   GLUCOSE  126*   BUN  13   CREATININE  0.82   CALCIUM  9.4     Recent Labs      04/28/18 2050   ALTSGPT  9   ASTSGOT  14   ALKPHOSPHAT  51   TBILIRUBIN  0.5   GLUCOSE  126*     Recent Labs      04/28/18 2050   APTT  24.6*   INR  0.92             Lab Results   Component Value Date    TROPONINI <0.01 04/28/2018     Urinalysis:  No results found for: SPECGRAVITY, GLUCOSEUR, KETONES, NITRITE, WBCURINE, RBCURINE, BACTERIA, EPITHELCELL     Imaging  CT-CTA NECK WITH & W/O-POST PROCESSING   Final Result      No focal stenosis, dissection or occlusion of the cervical carotid or vertebral arteries.      CT-CTA HEAD WITH & W/O-POST PROCESS   Final Result      No intracranial aneurysm, focal stenosis or abrupt large vessel cut off.      CT-CEREBRAL PERFUSION ANALYSIS   Final Result      Nondiagnostic CT perfusion study due to significant patient motion during the scan.      CT-HEAD W/O   Final Result      1.  No acute intracranial abnormality.   2.  Chronic LEFT maxillary sinus disease.      Echocardiogram Comp W/O cont    (Results Pending)   MR-BRAIN-W/O    (Results Pending)        Assessment/Plan     I anticipate this patient is appropriate for observation status at this time.    TIA (transient ischemic attack)- (present on admission)   Assessment & Plan    Expressive aphasia and slurring of speech that started at 7:30 PM and have now completely resolved  CT head and CTA head and neck negative for acute process  Patient will be placed on  continuous cardiac monitoring to evaluate for any arrhythmias  I will order MRI brain and 2-D echo for further evaluation  Check TSH, lipid panel and hemoglobin A1c  Patient has been started on full dose aspirin and high intensity statin  Consider discontinuing Premarin which may increase the risk of stroke and DVT  PT, OT and ST evaluation          Depression- (present on admission)   Assessment & Plan    Stable  Continue Lexapro        Nicotine abuse- (present on admission)   Assessment & Plan    Patient reports smoking a cigarettes and chewing nicotine gums  Has been counseled on cessation of e cigarettes use        HLD (hyperlipidemia)- (present on admission)   Assessment & Plan    Started on atorvastatin            VTE prophylaxis: SCD.

## 2018-04-29 NOTE — THERAPY
"53 y/o female from [place] admitted for difficulty speaking, Dx: TIA. Completely resolved at this time. Intact motor  sensory components BLE, intact balance during higher level balance activities, level ground ambulation with no AD and stair negotiation. No reports of dizziness, lightheadedness during functional mobility. No further acute PT  services required at this time.    Physical Therapy Evaluation completed.   Bed Mobility:  Supine to Sit: Independent  Transfers: Sit to Stand: Supervised  Gait: Level Of Assist: Supervised with No Equipment Needed       Plan of Care: Patient with no further skilled PT needs in the acute care setting at this time  Discharge Recommendations: Equipment: No Equipment Needed. Post-acute therapy Currently anticipate no further skilled therapy needs once patient is discharged from the inpatient setting.    See \"Rehab Therapy-Acute\" Patient Summary Report for complete documentation.     "

## 2020-04-01 NOTE — DISCHARGE PLANNING
Patient is from [facility] here for son's football game. Her PCP is Dr. Milagros Perez at [facility] at [address] [place] [address] in [facility]. She is independent in all activities, however reports she does not always take her medications as directed. Pt voices that she tends to forget. Discussed with patient potential of using a medication management system with allocated spots for each day of the week to help with remembering to take meds and to be able to determine if medications have already been taken.Care Transition Team Assessment    Information Source  Orientation : Oriented x 4  Information Given By: Patient  Who is responsible for making decisions for patient? : Patient    Readmission Evaluation  Is this a readmission?: No    Elopement Risk  Legal Hold: No  Ambulatory or Self Mobile in Wheelchair: Yes  Disoriented: No  Psychiatric Symptoms: None  History of Wandering: No  Elopement this Admit: No  Vocalizing Wanting to Leave: No  Displays Behaviors, Body Language Wanting to Leave: No-Not at Risk for Elopement    Interdisciplinary Discharge Planning  Does Admitting Nurse Feel This Could be a Complex Discharge?: No  Primary Care Physician: Milagros Perez at [address] ([phone])  Lives with - Patient's Self Care Capacity: Spouse  Patient or legal guardian wants to designate a caregiver (see row info): No  Support Systems: Spouse / Significant Other  Housing / Facility: [address]  Do You Take your Prescribed Medications Regularly: No (needs system)  Able to Return to Previous ADL's: Yes  Mobility Issues: Yes  Prior Services: None  Patient Expects to be Discharged to:: home  Assistance Needed: No  Durable Medical Equipment: Not Applicable    Discharge Preparedness  What is your plan after discharge?: Other (comment) (home with spouse)  What are your discharge supports?: Spouse  Prior Functional Level: Drives Self, Independent with Activities of Daily Living,  Independent with Medication Management    Functional Assesment  Prior Functional Level: Drives Self, Independent with Activities of Daily Living, Independent with Medication Management    Finances  Financial Barriers to Discharge: No  Prescription Coverage: Yes    Vision / Hearing Impairment  Vision Impairment : Yes  Right Eye Vision: Wears Glasses (wears at times)  Left Eye Vision: Wears Glasses (wears at times)    Values / Beliefs / Concerns  Values / Beliefs Concerns : No    Advance Directive  Advance Directive?:  (Patient from out of state, just visiting here)    Domestic Abuse  Have you ever been the victim of abuse or violence?: No    Psychological Assessment  History of Substance Abuse: None  History of Psychiatric Problems: No    Discharge Risks or Barriers  Discharge risks or barriers?: No    Anticipated Discharge Information  Anticipated discharge disposition: Home  Discharge Address: [address] [place] [address].  Discharge Contact Phone Number: [phone]         [Follow Up] : follow up GYN visit [FreeTextEntry1] : Possible infection

## 2020-04-06 ENCOUNTER — HOSPITAL ENCOUNTER (EMERGENCY)
Facility: MEDICAL CENTER | Age: 55
End: 2020-04-06
Attending: EMERGENCY MEDICINE
Payer: OTHER GOVERNMENT

## 2020-04-06 VITALS
WEIGHT: 123.02 LBS | HEART RATE: 80 BPM | BODY MASS INDEX: 22.64 KG/M2 | HEIGHT: 62 IN | RESPIRATION RATE: 18 BRPM | SYSTOLIC BLOOD PRESSURE: 114 MMHG | DIASTOLIC BLOOD PRESSURE: 70 MMHG | OXYGEN SATURATION: 99 % | TEMPERATURE: 98.7 F

## 2020-04-06 DIAGNOSIS — R68.84 JAW PAIN: ICD-10-CM

## 2020-04-06 DIAGNOSIS — R59.1 LYMPHADENOPATHY: ICD-10-CM

## 2020-04-06 LAB — EKG IMPRESSION: NORMAL

## 2020-04-06 PROCEDURE — 93005 ELECTROCARDIOGRAM TRACING: CPT | Performed by: EMERGENCY MEDICINE

## 2020-04-06 PROCEDURE — 99284 EMERGENCY DEPT VISIT MOD MDM: CPT

## 2020-04-06 PROCEDURE — 700102 HCHG RX REV CODE 250 W/ 637 OVERRIDE(OP): Performed by: EMERGENCY MEDICINE

## 2020-04-06 PROCEDURE — A9270 NON-COVERED ITEM OR SERVICE: HCPCS | Performed by: EMERGENCY MEDICINE

## 2020-04-06 RX ORDER — BUPIVACAINE HYDROCHLORIDE 2.5 MG/ML
10 INJECTION, SOLUTION EPIDURAL; INFILTRATION; INTRACAUDAL ONCE
Status: DISCONTINUED | OUTPATIENT
Start: 2020-04-06 | End: 2020-04-06 | Stop reason: HOSPADM

## 2020-04-06 RX ORDER — AMOXICILLIN 250 MG/1
1000 CAPSULE ORAL ONCE
Status: COMPLETED | OUTPATIENT
Start: 2020-04-06 | End: 2020-04-06

## 2020-04-06 RX ORDER — AMOXICILLIN 500 MG/1
1000 CAPSULE ORAL 2 TIMES DAILY
Qty: 28 CAP | Refills: 0 | Status: SHIPPED | OUTPATIENT
Start: 2020-04-06 | End: 2020-04-13

## 2020-04-06 RX ORDER — ACETAMINOPHEN 500 MG
1000 TABLET ORAL ONCE
Status: COMPLETED | OUTPATIENT
Start: 2020-04-06 | End: 2020-04-06

## 2020-04-06 RX ADMIN — ACETAMINOPHEN 1000 MG: 500 TABLET, FILM COATED ORAL at 05:49

## 2020-04-06 RX ADMIN — AMOXICILLIN 1000 MG: 250 CAPSULE ORAL at 05:51

## 2020-04-06 ASSESSMENT — FIBROSIS 4 INDEX: FIB4 SCORE: 0.88

## 2020-04-06 NOTE — ED PROVIDER NOTES
ED Provider Note    CHIEF COMPLAINT  Chief Complaint   Patient presents with   • Dental Pain       HPI    Primary care provider: None currently, new to the area.   Means of arrival: Walk-in/POV  History obtained from: Patient  History limited by: Nothing    Elise Pozo is a 54 y.o. female who presents with left jaw pain.  Onset yesterday morning.  Achy and mild.  Nonradiating.  Thought this was dental pain and so she took Advil yesterday morning and evening which gave mild relief.  Awoke this morning at 4 AM and the pain was worse and she noticed some swelling under her left jawline, so she came in for emergent evaluation.  She denies any associated actual tooth pain, no difficulty swallowing or breathing or voice changes, no cough, no upper respiratory infectious symptoms, no chest pain or dyspnea or fevers or abdominal pain or nausea or vomiting.  No prior episodes.  However on further questioning she does think that in the past she has noticed some swelling underneath her left jaw several times over the last several years, usually mild and not associated with this level of pain.  When asked to isolate the site of pain it is anterior to her jaw isolated to masticatory muscles and they are tender to palpation.  No facial swelling or redness.  The patient takes no immunosuppressive medicines.  She just moved here from [place] 1 month ago and has not established with a dentist or primary care provider.  She has had a crown on her left posterior inferior molar in the past.    REVIEW OF SYSTEMS  Constitutional: Negative for fever or chills.   HENT: Negative for rhinorrhea or sore throat. Positive for jaw pain.   Respiratory: Negative for cough or shortness of breath.    Cardiovascular: Negative for chest pain or palpitations.   Gastrointestinal: Negative for nausea, vomiting, or abdominal pain.   Musculoskeletal: Negative for back pain or joint pain.   Skin: Negative for itching or rash.   Neurological:  "Negative for sensory or motor changes.      PAST MEDICAL HISTORY  Depression.     PAST FAMILY HISTORY  No pertinent past family history.     SOCIAL HISTORY  Social History     Tobacco Use   • Smoking status: Former Smoker   • Smokeless tobacco: Never Used   Substance and Sexual Activity   • Alcohol use: Yes   • Drug use: No   • Sexual activity: Not on file       SURGICAL HISTORY   has a past surgical history that includes gyn surgery.  And cholecystectomy    CURRENT MEDICATIONS  Home Medications     Reviewed by Carrillo Ross R.N. (Registered Nurse) on 04/06/20 at 0528  Med List Status: <None>   Medication Last Dose Status   ALPRAZolam (XANAX) 0.5 MG Tab  Active   colestipol (COLESTID) 1 GM Tab  Active   conjugated estrogen (PREMARIN) 0.625 MG Tab  Active   escitalopram (LEXAPRO) 10 MG Tab  Active   fexofenadine (ALLEGRA) 60 MG Tab  Active   sulfamethoxazole-trimethoprim (BACTRIM DS) 800-160 MG tablet  Active                ALLERGIES  Allergies   Allergen Reactions   • Demerol Anaphylaxis   • Morphine Anaphylaxis   • Ciprofloxacin Rash     red   • Clindamycin    • Pcn [Penicillins] Rash     Red         PHYSICAL EXAM  VITAL SIGNS: /70   Pulse 80   Temp 37.1 °C (98.7 °F) (Temporal)   Resp 18   Ht 1.575 m (5' 2\")   Wt 55.8 kg (123 lb 0.3 oz)   SpO2 99%   BMI 22.50 kg/m²    Pulse ox interpretation: On room air, I interpret this pulse ox as normal.  Constitutional: Well-developed, well-nourished. Sitting up.   HEENT: Normocephalic, atraumatic. Posterior pharynx clear, mucous membranes moist.  Left posterior inferior molar has a crown over that there is no gum erythema, floor of the mouth is soft, there is some fullness approximately 2 cm underneath her left mandible that is not erythematous.  Minimally tender but the muscles of mastication anterior to her left jawline are tender including the masseter.  No buccal swelling or gum swelling, normal voice no stridor uvula midline.  Eyes:  EOMI. Normal " sclerae.  Neck: Supple, nontender.  Chest/Pulmonary: Clear to ausculation bilaterally, no wheezes or rhonchi.  Cardiovascular: Regular rate and rhythm, no murmur.   Abdomen: Soft, nontender; no rebound, guarding, or masses.  Back: No CVA or midline tenderness.   Musculoskeletal: No deformity or edema.  Neuro: Clear speech, normal coordination, cranial nerves II-XII grossly intact, no focal asymmetry or sensory deficits.   Psych: Normal mood and affect.  Skin: No redness or rashes, warm and dry.      DIAGNOSTIC STUDIES / PROCEDURES    LABS & EKG  Results for orders placed or performed during the hospital encounter of 20   EKG   Result Value Ref Range    Report       [facility]    Test Date:  2020  Pt Name:    SHELIA ENGEL                  Department: [facility]  MRN:        [id number]                      [facility]  Gender:     Female                       Technician: NORIS  :        [id number]                   Requested By:AWA GEORGE  Order #:    [id number]                    Reading MD: Awa George MD    Measurements  Intervals                                Axis  Rate:       71                           P:          74  MD:         156                          QRS:        60  QRSD:       78                           T:          38  QT:         428  QTc:        466    Interpretive Statements  SINUS RHYTHM  Compared to ECG 2018 20:58:11  No significant changes  Stable EKG no STEMI or strain or dysrhythmia  Electronically Signed On 2020 6:10:22 PDT by Awa George MD           RADIOLOGY  No orders to display         COURSE & MEDICAL DECISION MAKING    This is a 54 y.o. female who presents with left jaw pain.    Differential Diagnosis includes but is not limited to:  Dentalgia, dental infection, lymphadenopathy, sialolith, osteo, ACS mimic    ED Course:  This is a well-appearing 54-year-old female with no history of diabetes or  immunosuppression coming in with 1 day of left jaw pain.  Very well-appearing she has a crown on her left inferior posterior molar, there is no gum erythema.    The patient's exam is reassuring at this time, and include the following findings:  -Uvula midline, no obvious masses doubt PTA  -Normal voice, tolerating secretions, doubt epiglottitis  -No trismus, doubt RPA  -Floor of mouth soft, no bulging neck masses doubt Benji's Angina  -Normal vitals, doubt Lemierre Syndrome  -No obvious exudate, masses, ulcers or eschars, doubt severe bacterial or fungal infection or malignancy    There is a small firm smooth minimally tender lymph node or possibly swollen salivary gland.  I performed a bedside ultrasound it did not appear loculated or complex.  There is no skin erythema doubt abscess.  In case this is an early dental infection discussed risks and benefits of amoxicillin with the patient, she reports a childhood rash with penicillin and has never had any other saline antibiotic.  She is willing to try first dose here.  Tolerated well.  Discussed with patient this could be a salivary gland issue or a sialolith, she will try some sour candies when she gets home.  EKG performed because this seems to be such as a mild and well-appearing presentation to ensure this is not an ACS mimic and she has no evidence of ST elevation or depression.  Plan follow-up with a dentist and ENT if she does have increased significant swelling with a sour candy, amoxicillin for 1 week, continue anti-inflammatories and if she has any new or worsening symptoms she will seek emergent reevaluation.  Schedulers were contacted to arrange a new primary care as she is only been living in [place] for 1 month.  She has plans to follow-up with a dentist outpatient.    Medications   bupivacaine 0.25% (SENSORCAINE-MARCAINE) pf injection 10 mL (0 mL Injection Dose not Required 4/6/20 2838)   amoxicillin (AMOXIL) capsule 1,000 mg (1,000 mg Oral Given 4/6/20  0551)   acetaminophen (TYLENOL) tablet 1,000 mg (1,000 mg Oral Given 4/6/20 0549)       FINAL IMPRESSION  1. Jaw pain    2. Lymphadenopathy        PRESCRIPTIONS  New Prescriptions    AMOXICILLIN (AMOXIL) 500 MG CAP    Take 2 Caps by mouth 2 times a day for 7 days.       FOLLOW UP  [facility], Emergency Dept  [address]  [place] [id number] [phone]  Today  If you have ANY new or worse symptoms!    Our primary care clinics  Schedulers should contact you in the next few days to arrange a new primary care provider for recheck and routine health care.  Schedule an appointment as soon as possible for a visit in 2 days      Stu Sin M.D.  [address] [place] [id number]  [phone]      with ENT doctor as needed      -DISCHARGE-       Results, exam findings, clinical impression, presumed diagnosis, treatment options, and strict return precautions were discussed with the patient, and they verbalized understanding, agreed with, and appreciated the plan of care.    Pertinent EKG reviewed and verified by myself, as well as nursing notes and the patient's past medical, family, and social histories (See chart for details).    The patient is referred to a primary physician for blood pressure management, diabetic screening, and for all other preventative health concerns.     Portions of this record were made with voice recognition software.  Despite my review, spelling/grammar/context errors may still remain.  Interpretation of this chart should be taken in this context.    Electronically signed by Jax Norris M.D. on 4/6/2020 at 6:15 AM.

## 2020-04-06 NOTE — DISCHARGE INSTRUCTIONS
You were seen and evaluated in the Emergency Department at [facility] for:     Left jaw pain and lump of your jaw    You had the following tests and studies:    Thankfully, your examination today is reassuring.  This does not appear to be from your teeth, this could be a swollen lymph node or possibly blockage or inflammation of your salivary gland.  You have normal vital signs, so I think you are safe to go home.  Try a sour candy and suck on it at home.  In case this is an early dental infection we will treat you with amoxicillin for 1 week.  Take ibuprofen 400 mg and acetaminophen 1000 mg 3 times per day for the next 3 days for pain control and try and follow-up with the dentist.  If you do get increased swelling suddenly when sucking on sour candy you should follow-up with her ENT doctor.    You received the following medications:    Amoxicillin and acetaminophen.    You received the following prescriptions:    Amoxicillin, take as prescribed.  ----------------------------    Please make sure to follow up with:    We will contact her schedulers to get your new primary care provider referral for recheck and routine health care, follow-up with a dentist by the end of the week, and if you have swelling after a sour candy follow-up with an ENT doctor.    If you get any new or worsening symptoms like increased swelling or pain or fevers or difficulty swallowing or breathing or any other concerns return to the ER immediately.    Good luck, we hope you get better soon!  ----------------------------    We always encourage patients to return IMMEDIATELY if they have:  Increased or changing pain, passing out, fevers over 100.4 (taken in your mouth or rectally) for more than 2 days, redness or swelling of skin or tissues, feeling like your heart is beating fast, chest pain that is new or worsening, trouble breathing, feeling like your throat is closing up and can not breath, inability to walk, weakness of any  part of your body, new dizziness, severe bleeding that won't stop from any part of your body, if you can't eat or drink, or if you have any other concerns.   If you feel worse, please know that you can always return with any questions, concerns, worse symptoms, or you are feeling unsafe. We certainly cannot say for sure that we have ruled out every illness or dangerous disease, but we feel that at this specific time, your exam, tests, and vital signs like heart rate and blood pressure are safe for discharge.

## 2020-05-20 ENCOUNTER — APPOINTMENT (RX ONLY)
Dept: URBAN - METROPOLITAN AREA CLINIC 4 | Facility: CLINIC | Age: 55
Setting detail: DERMATOLOGY
End: 2020-05-20

## 2020-08-17 ENCOUNTER — HOSPITAL ENCOUNTER (OUTPATIENT)
Facility: MEDICAL CENTER | Age: 55
End: 2020-08-17
Attending: PHYSICIAN ASSISTANT
Payer: OTHER GOVERNMENT

## 2020-08-17 ENCOUNTER — OFFICE VISIT (OUTPATIENT)
Dept: URGENT CARE | Facility: CLINIC | Age: 55
End: 2020-08-17
Payer: OTHER GOVERNMENT

## 2020-08-17 VITALS
HEART RATE: 82 BPM | WEIGHT: 123 LBS | HEIGHT: 62 IN | OXYGEN SATURATION: 96 % | BODY MASS INDEX: 22.63 KG/M2 | SYSTOLIC BLOOD PRESSURE: 112 MMHG | DIASTOLIC BLOOD PRESSURE: 70 MMHG | RESPIRATION RATE: 18 BRPM | TEMPERATURE: 97.8 F

## 2020-08-17 DIAGNOSIS — R05.9 COUGH: ICD-10-CM

## 2020-08-17 PROCEDURE — 99203 OFFICE O/P NEW LOW 30 MIN: CPT | Performed by: PHYSICIAN ASSISTANT

## 2020-08-17 PROCEDURE — U0003 INFECTIOUS AGENT DETECTION BY NUCLEIC ACID (DNA OR RNA); SEVERE ACUTE RESPIRATORY SYNDROME CORONAVIRUS 2 (SARS-COV-2) (CORONAVIRUS DISEASE [COVID-19]), AMPLIFIED PROBE TECHNIQUE, MAKING USE OF HIGH THROUGHPUT TECHNOLOGIES AS DESCRIBED BY CMS-2020-01-R: HCPCS

## 2020-08-17 ASSESSMENT — ENCOUNTER SYMPTOMS
FEVER: 1
WHEEZING: 0
SHORTNESS OF BREATH: 0
SWOLLEN GLANDS: 1
SORE THROAT: 1
SPUTUM PRODUCTION: 0
DIZZINESS: 0
CARDIOVASCULAR NEGATIVE: 1
GASTROINTESTINAL NEGATIVE: 1
COUGH: 0
HEADACHES: 1
CHILLS: 0
MUSCULOSKELETAL NEGATIVE: 1
SINUS PAIN: 1
SINUS PRESSURE: 1

## 2020-08-18 DIAGNOSIS — R05.9 COUGH: ICD-10-CM

## 2020-08-18 LAB
COVID ORDER STATUS COVID19: NORMAL
SARS-COV-2 RNA RESP QL NAA+PROBE: NOTDETECTED
SPECIMEN SOURCE: NORMAL

## 2020-08-18 NOTE — PROGRESS NOTES
Subjective:      Elise Pozo is a 54 y.o. female who presents with Fever (Couple days fever, postnasal dripping and sorethroat.)            Runny nose and congestion.  No cough, shortness of breath, wheezing, fever, chest pain, lower leg swelling.  Patient thinks it may be allergies related to the smoke in the air.    Sinus Problem  This is a new problem. The current episode started in the past 7 days. The problem is unchanged. There has been no fever. The fever has been present for less than 1 day. Associated symptoms include congestion, headaches, sinus pressure, a sore throat and swollen glands. Pertinent negatives include no chills, coughing, ear pain or shortness of breath. Past treatments include oral decongestants and spray decongestants (NSAIDS). The treatment provided mild relief.       PMH:  has no past medical history on file.  MEDS:   Current Outpatient Medications:   •  escitalopram (LEXAPRO) 10 MG Tab, Take 10 mg by mouth every evening., Disp: , Rfl:   •  ALPRAZolam (XANAX) 0.5 MG Tab, Take 0.5 mg by mouth at bedtime as needed for Sleep., Disp: , Rfl:   •  fexofenadine (ALLEGRA) 60 MG Tab, Take 120 mg by mouth 2 times a day., Disp: , Rfl:   •  conjugated estrogen (PREMARIN) 0.625 MG Tab, Take 0.625 mg by mouth every day., Disp: , Rfl:   •  colestipol (COLESTID) 1 GM Tab, Take 2 g by mouth every evening., Disp: , Rfl:   •  sulfamethoxazole-trimethoprim (BACTRIM DS) 800-160 MG tablet, Take 1 Tab by mouth 2 times a day. Second 7 day course for a staph infection, per pt. Finished >2 weeks ago, reported 4/28/18, Disp: , Rfl:   ALLERGIES:   Allergies   Allergen Reactions   • Demerol Anaphylaxis   • Morphine Anaphylaxis   • Ciprofloxacin Rash     red   • Clindamycin    • Pcn [Penicillins] Rash     Red       SURGHX:   Past Surgical History:   Procedure Laterality Date   • GYN SURGERY       SOCHX:  reports that she has quit smoking. She has never used smokeless tobacco. She reports current alcohol use.  "She reports that she does not use drugs.  FH: family history is not on file.    Review of Systems   Constitutional: Positive for fever. Negative for chills.   HENT: Positive for congestion, sinus pressure, sinus pain and sore throat. Negative for ear pain.    Respiratory: Negative for cough, sputum production, shortness of breath and wheezing.    Cardiovascular: Negative.    Gastrointestinal: Negative.    Musculoskeletal: Negative.    Neurological: Positive for headaches. Negative for dizziness.       Medications, Allergies, and current problem list reviewed today in Epic     Objective:     /70   Pulse 82   Temp 36.6 °C (97.8 °F) (Temporal)   Resp 18   Ht 1.575 m (5' 2\")   Wt 55.8 kg (123 lb)   SpO2 96%   BMI 22.50 kg/m²      Physical Exam  Vitals signs and nursing note reviewed.   Constitutional:       General: She is not in acute distress.     Appearance: She is well-developed. She is not diaphoretic.   HENT:      Head: Normocephalic and atraumatic.      Right Ear: Tympanic membrane and external ear normal.      Left Ear: Tympanic membrane and external ear normal.      Nose: Rhinorrhea present. No congestion.      Mouth/Throat:      Pharynx: No oropharyngeal exudate.   Eyes:      General:         Right eye: No discharge.         Left eye: No discharge.      Conjunctiva/sclera: Conjunctivae normal.      Pupils: Pupils are equal, round, and reactive to light.   Neck:      Musculoskeletal: Normal range of motion and neck supple.   Cardiovascular:      Rate and Rhythm: Normal rate and regular rhythm.      Heart sounds: Normal heart sounds.   Pulmonary:      Effort: Pulmonary effort is normal. No respiratory distress.      Breath sounds: Normal breath sounds. No wheezing, rhonchi or rales.   Musculoskeletal: Normal range of motion.      Right lower leg: No edema.      Left lower leg: No edema.   Lymphadenopathy:      Cervical: No cervical adenopathy.   Skin:     General: Skin is warm and dry. "   Neurological:      Mental Status: She is alert and oriented to person, place, and time.   Psychiatric:         Behavior: Behavior normal.         Thought Content: Thought content normal.         Judgment: Judgment normal.                 Assessment/Plan:         1. Cough  COVID/SARS COV-2 PCR     Dry cough, runny nose, congestion.  Vitals normal, PO2 adequate.  Lungs clear bilaterally without wheezes rhonchi or rales.  No lower leg swelling and vascular exam unremarkable.  Likely allergy secondary to smoke.  Will initiate COVID testing.  OTC meds and conservative measures as discussed    Return to clinic or go to ED if symptoms worsen or persist. Indications for ED discussed at length. Patient voices understanding. Follow-up with your primary care provider in 3-5 days. Red flags discussed. All side effects of medication discussed including allergic response, GI upset, tendon injury, etc.    Please note that this dictation was created using voice recognition software. I have made every reasonable attempt to correct obvious errors, but I expect that there are errors of grammar and possibly content that I did not discover before finalizing the note.

## 2020-08-19 ENCOUNTER — TELEPHONE (OUTPATIENT)
Dept: URGENT CARE | Facility: CLINIC | Age: 55
End: 2020-08-19

## 2020-08-20 NOTE — TELEPHONE ENCOUNTER
----- Message from Pantera Mccoy P.A.-C. sent at 8/19/2020  9:13 AM PDT -----  Please advise patient of negative COVID results.  Thank you

## 2020-09-03 ENCOUNTER — HOSPITAL ENCOUNTER (EMERGENCY)
Facility: MEDICAL CENTER | Age: 55
End: 2020-09-03
Attending: EMERGENCY MEDICINE
Payer: OTHER GOVERNMENT

## 2020-09-03 VITALS
SYSTOLIC BLOOD PRESSURE: 107 MMHG | HEART RATE: 71 BPM | HEIGHT: 62 IN | BODY MASS INDEX: 22.64 KG/M2 | RESPIRATION RATE: 16 BRPM | WEIGHT: 123.02 LBS | OXYGEN SATURATION: 97 % | DIASTOLIC BLOOD PRESSURE: 68 MMHG | TEMPERATURE: 97.6 F

## 2020-09-03 DIAGNOSIS — M79.641 PAIN OF RIGHT HAND: ICD-10-CM

## 2020-09-03 PROCEDURE — A9270 NON-COVERED ITEM OR SERVICE: HCPCS | Performed by: EMERGENCY MEDICINE

## 2020-09-03 PROCEDURE — 700102 HCHG RX REV CODE 250 W/ 637 OVERRIDE(OP): Performed by: EMERGENCY MEDICINE

## 2020-09-03 PROCEDURE — 99282 EMERGENCY DEPT VISIT SF MDM: CPT

## 2020-09-03 RX ORDER — ASPIRIN 325 MG
325 TABLET ORAL ONCE
Status: COMPLETED | OUTPATIENT
Start: 2020-09-03 | End: 2020-09-03

## 2020-09-03 RX ADMIN — ASPIRIN 325 MG ORAL TABLET 325 MG: 325 PILL ORAL at 20:47

## 2020-09-04 NOTE — ED PROVIDER NOTES
ED Provider Note    CHIEF COMPLAINT  No chief complaint on file.      HPI  Elise Pozo is a 54 y.o. female who presents with complaint of hand pain.  Patient reports that she has had symptoms largely consistent with COVID over the last few weeks.  Patient reports that she is feeling better from that standpoint she had a couple test done around a week ago and she is waiting for her results for this.  Patient denies any ongoing fever she does have occasional body aches.  She developed some pain in her wrist, she is unsure if she hit her wrist on anything.  She reports the pain is on her right hand.  She reports it appears that 1 of the veins are swollen immediately over the dorsal ulnar aspect of her wrist and she is concerned about this.  She denies any associated arm swelling.   REVIEW OF SYSTEMS  ROS  See HPI for further details. All other systems are negative.     PAST MEDICAL HISTORY       SOCIAL HISTORY  Social History     Tobacco Use   • Smoking status: Former Smoker   • Smokeless tobacco: Never Used   Substance and Sexual Activity   • Alcohol use: Yes   • Drug use: No   • Sexual activity: Not on file       SURGICAL HISTORY   has a past surgical history that includes gyn surgery.    CURRENT MEDICATIONS  Home Medications     Reviewed by Teri Flores R.N. (Registered Nurse) on 09/03/20 at 1952  Med List Status: Not Addressed   Medication Last Dose Status   ALPRAZolam (XANAX) 0.5 MG Tab  Active   colestipol (COLESTID) 1 GM Tab  Active   conjugated estrogen (PREMARIN) 0.625 MG Tab  Active   escitalopram (LEXAPRO) 10 MG Tab  Active   fexofenadine (ALLEGRA) 60 MG Tab  Active   sulfamethoxazole-trimethoprim (BACTRIM DS) 800-160 MG tablet  Active                ALLERGIES  Allergies   Allergen Reactions   • Demerol Anaphylaxis   • Morphine Anaphylaxis   • Ciprofloxacin Rash     red   • Clindamycin    • Pcn [Penicillins] Rash     Red         PHYSICAL EXAM  Physical Exam   Constitutional: She is oriented to  person, place, and time. She appears well-developed and well-nourished.   HENT:   Head: Normocephalic and atraumatic.   Eyes: Conjunctivae are normal.   Neck: Normal range of motion. Neck supple.   Cardiovascular: Normal rate, regular rhythm and normal heart sounds.   No murmur heard.  Pulmonary/Chest: Effort normal and breath sounds normal.   Musculoskeletal:      Comments: There is some very mild tenderness of the ulnar styloid and there is some minor swelling of 1 of the overlying veins.  There is no surrounding erythema.  The rest the patient's arm is clear of any cords, swelling.  Patient is full range of motion of her wrist.  Elbow.  And shoulder. Distal pulses 2+, sensation intact in ulnar/radial/median distribution     Neurological: She is alert and oriented to person, place, and time.   Skin: Skin is warm.   Psychiatric: She has a normal mood and affect. Her behavior is normal.       COURSE & MEDICAL DECISION MAKING  Pertinent Labs & Imaging studies reviewed. (See chart for details)    Patient here with very mild pain at the ulnar styloid.  There is no evidence of a fracture here, I believe it is very unlikely.  I have offered to do an x-ray however patient would like to defer at this point.  I am comfortable deferring.  She is worried that there is a blood clot.  Though atelectasis possible this could be representative of superficial thrombophlebitis versus less likely.  Nonetheless given that this could be a mild bony contusion will place patient on aspirin for pain, anti-inflammatory effect and for any effect of a possible superficial thrombophlebitis.  Patient without any associated arm swelling or any other evidence of a deep venous thrombosis at this point.  I have offered to check basic labs and a repeat carotid test for patient but she would also like to defer and rather follow-up with her primary care physician.     The patient will return for worsening symptoms and is stable at the time of  discharge. The patient verbalizes understanding and will comply.    FINAL IMPRESSION    1. Pain of right hand               Electronically signed by: Ulisses Tomlinson M.D., 9/3/2020 8:26 PM

## 2020-09-04 NOTE — DISCHARGE INSTRUCTIONS
You may have a small bony contusion.  Your exam is typically concerning for a fracture.  As we discussed I do believe an x-ray is indicated.  If you develop a fever or considerable swelling in your arm return to the emergency department.  This could represent something called superficial thrombophlebitis which is a small clot in the superficial veins that do not require strong anticoagulation and just need aspirin or anti-inflammatories.  We will give you 2 weeks of aspirin therapy.  If your symptoms worsen return to the emergency department.

## 2020-09-04 NOTE — ED TRIAGE NOTES
Pt presents with  from home for right wrist pain that began 1 hour ago. Appears to bruising around a vein that is painful.   Also reports she felt ill 8/15 with COVID symptoms - COVID test 8/16 was negative. Patient concerned because she was tested again on 8/21 but never got a result. Upper back pain returned that she had w her COVID symptoms. No fever or cough or resp symptoms now. Pt A&Ox4 and ambulatory.     Patient masked. No respiratory symptoms, no recent travel, denies known COVID exposure.

## 2020-09-15 ENCOUNTER — APPOINTMENT (RX ONLY)
Dept: URBAN - METROPOLITAN AREA CLINIC 22 | Facility: CLINIC | Age: 55
Setting detail: DERMATOLOGY
End: 2020-09-15

## 2020-09-15 DIAGNOSIS — L30.9 DERMATITIS, UNSPECIFIED: ICD-10-CM

## 2020-09-15 DIAGNOSIS — L21.8 OTHER SEBORRHEIC DERMATITIS: ICD-10-CM

## 2020-09-15 PROCEDURE — ? ADDITIONAL NOTES

## 2020-09-15 PROCEDURE — ? COUNSELING

## 2020-09-15 PROCEDURE — 11102 TANGNTL BX SKIN SINGLE LES: CPT

## 2020-09-15 PROCEDURE — ? BIOPSY BY SHAVE METHOD

## 2020-09-15 PROCEDURE — ? PRESCRIPTION

## 2020-09-15 PROCEDURE — 99202 OFFICE O/P NEW SF 15 MIN: CPT | Mod: 25

## 2020-09-15 RX ORDER — FLUOCINONIDE 0.5 MG/ML
1 SOLUTION TOPICAL BID
Qty: 1 | Refills: 0 | Status: ERX | COMMUNITY
Start: 2020-09-15

## 2020-09-15 RX ADMIN — FLUOCINONIDE 1: 0.5 SOLUTION TOPICAL at 00:00

## 2020-09-15 ASSESSMENT — LOCATION DETAILED DESCRIPTION DERM
LOCATION DETAILED: SUPERIOR MID FOREHEAD
LOCATION DETAILED: LEFT ELBOW
LOCATION DETAILED: RIGHT ELBOW
LOCATION DETAILED: HAIR

## 2020-09-15 ASSESSMENT — LOCATION ZONE DERM
LOCATION ZONE: ARM
LOCATION ZONE: FACE
LOCATION ZONE: SCALP

## 2020-09-15 ASSESSMENT — LOCATION SIMPLE DESCRIPTION DERM
LOCATION SIMPLE: LEFT ELBOW
LOCATION SIMPLE: HAIR
LOCATION SIMPLE: SUPERIOR FOREHEAD
LOCATION SIMPLE: RIGHT ELBOW

## 2020-09-15 NOTE — HPI: RASH (SEBORRHEIC DERMATITIS)
Additional History: Dx as seb derm by pcp via telemedicine.    Has used ketoconazole twice without change.    Only really itchy along part line

## 2020-09-15 NOTE — PROCEDURE: ADDITIONAL NOTES
Detail Level: Simple
Additional Notes: Patient has a prescription of triamcinolone at home she will use on the sites in her arms twice daily up to 14 days.  \\n+/- related to scalp pathology as all started in same time frame.

## 2020-09-23 ENCOUNTER — APPOINTMENT (RX ONLY)
Dept: URBAN - METROPOLITAN AREA CLINIC 22 | Facility: CLINIC | Age: 55
Setting detail: DERMATOLOGY
End: 2020-09-23

## 2020-09-23 DIAGNOSIS — L93.0 DISCOID LUPUS ERYTHEMATOSUS: ICD-10-CM

## 2020-09-23 PROCEDURE — 11900 INJECT SKIN LESIONS </W 7: CPT

## 2020-09-23 PROCEDURE — ? INTRALESIONAL KENALOG

## 2020-09-23 PROCEDURE — ? ADDITIONAL NOTES

## 2020-09-23 PROCEDURE — ? ORDER TESTS

## 2020-09-23 PROCEDURE — ? COUNSELING

## 2020-09-23 ASSESSMENT — LOCATION DETAILED DESCRIPTION DERM
LOCATION DETAILED: LEFT PROXIMAL DORSAL FOREARM
LOCATION DETAILED: RIGHT SUPERIOR PARIETAL SCALP
LOCATION DETAILED: LEFT MEDIAL FRONTAL SCALP
LOCATION DETAILED: MID-FRONTAL SCALP
LOCATION DETAILED: LEFT SUPERIOR MEDIAL FOREHEAD

## 2020-09-23 ASSESSMENT — LOCATION SIMPLE DESCRIPTION DERM
LOCATION SIMPLE: LEFT FOREARM
LOCATION SIMPLE: SCALP
LOCATION SIMPLE: LEFT SCALP
LOCATION SIMPLE: LEFT FOREHEAD
LOCATION SIMPLE: ANTERIOR SCALP

## 2020-09-23 ASSESSMENT — LOCATION ZONE DERM
LOCATION ZONE: FACE
LOCATION ZONE: SCALP
LOCATION ZONE: ARM

## 2020-09-23 NOTE — PROCEDURE: ADDITIONAL NOTES
Additional Notes: Discussed dx of DLE/CCLE in detail\\nEmphasized use of UV protection with sunscreen, appropriate clothing/hats, sun avoidance when possible. \\nShe will have labs done to screen for signs of SLE. \\nPrevious labs reviewed patient brought in all WNL including renal function, she has never had [place] screening she is aware of.  No FH.   No other symptoms currently attributable or suggestive of systemic dz.
Detail Level: Simple

## 2020-09-23 NOTE — PROCEDURE: ORDER TESTS
Bill For Surgical Tray: no
Performing Laboratory: -[id number]
Expected Date Of Service: 09/23/2020
Billing Type: Third-Party Bill

## 2020-09-23 NOTE — PROCEDURE: INTRALESIONAL KENALOG
Total Volume (Ccs): 2.0
Expiration Date For Kenalog (Optional): ODK0990
Kenalog Preparation: Kenalog
Administered By (Optional): Giana Nava PA-C
Treatment Number (Optional): 1
Ndc# For Kenalog Only: 01/2022
Medical Necessity Clause: This procedure was medically necessary because the lesions that were treated were:
Concentration Of Kenalog Solution Injected (Mg/Ml): 10.0
Detail Level: Detailed
Include Z78.9 (Other Specified Conditions Influencing Health Status) As An Associated Diagnosis?: No
Consent: The risks of atrophy were reviewed with the patient.
X Size Of Lesion In Cm (Optional): 0

## 2020-11-03 ENCOUNTER — APPOINTMENT (RX ONLY)
Dept: URBAN - METROPOLITAN AREA CLINIC 22 | Facility: CLINIC | Age: 55
Setting detail: DERMATOLOGY
End: 2020-11-03

## 2020-11-03 DIAGNOSIS — L93.0 DISCOID LUPUS ERYTHEMATOSUS: ICD-10-CM

## 2020-11-03 PROCEDURE — ? LAB REPORTS REVIEWED

## 2020-11-03 PROCEDURE — ? PRESCRIPTION

## 2020-11-03 PROCEDURE — ? ADDITIONAL NOTES

## 2020-11-03 PROCEDURE — 99214 OFFICE O/P EST MOD 30 MIN: CPT

## 2020-11-03 PROCEDURE — ? COUNSELING

## 2020-11-03 PROCEDURE — ? REFERRAL CORRESPONDENCE

## 2020-11-03 RX ORDER — TRIAMCINOLONE ACETONIDE 1 MG/G
1 CREAM TOPICAL BID
Qty: 1 | Refills: 3 | Status: ERX | COMMUNITY
Start: 2020-11-03

## 2020-11-03 RX ADMIN — TRIAMCINOLONE ACETONIDE 1: 1 CREAM TOPICAL at 00:00

## 2020-11-03 ASSESSMENT — LOCATION SIMPLE DESCRIPTION DERM: LOCATION SIMPLE: LEFT SCALP

## 2020-11-03 ASSESSMENT — LOCATION DETAILED DESCRIPTION DERM: LOCATION DETAILED: LEFT MEDIAL FRONTAL SCALP

## 2020-11-03 ASSESSMENT — LOCATION ZONE DERM: LOCATION ZONE: SCALP

## 2020-11-03 NOTE — PROCEDURE: ADDITIONAL NOTES
Additional Notes: Labs reviewed with patient.  \\nRheum referral placed. \\nSymptoms improved with ILK, no itching or discomfort.  Mild erythema.   Patient will continue topical CS to affected areas.  \\nPatient notes 1 week of itchy papule on cheek.   Discussed possible DLE lesion. Trial of Tac ointment to spot only.  Continue sun avoidance.  Patient has been wearing UV protective hats, clothing and sunscreen regularly .
Detail Level: Detailed

## 2020-12-01 ENCOUNTER — APPOINTMENT (OUTPATIENT)
Dept: RHEUMATOLOGY | Facility: MEDICAL CENTER | Age: 55
End: 2020-12-01
Payer: OTHER GOVERNMENT

## 2020-12-21 ENCOUNTER — OFFICE VISIT (OUTPATIENT)
Dept: RHEUMATOLOGY | Facility: MEDICAL CENTER | Age: 55
End: 2020-12-21
Payer: OTHER GOVERNMENT

## 2020-12-21 VITALS
BODY MASS INDEX: 23.05 KG/M2 | SYSTOLIC BLOOD PRESSURE: 110 MMHG | HEART RATE: 86 BPM | DIASTOLIC BLOOD PRESSURE: 60 MMHG | RESPIRATION RATE: 14 BRPM | OXYGEN SATURATION: 98 % | WEIGHT: 126 LBS | TEMPERATURE: 97.3 F

## 2020-12-21 DIAGNOSIS — Z79.899 LONG-TERM USE OF PLAQUENIL: ICD-10-CM

## 2020-12-21 DIAGNOSIS — Z72.0 NICOTINE ABUSE: ICD-10-CM

## 2020-12-21 DIAGNOSIS — L93.0 DISCOID LUPUS: ICD-10-CM

## 2020-12-21 PROCEDURE — 99205 OFFICE O/P NEW HI 60 MIN: CPT | Performed by: INTERNAL MEDICINE

## 2020-12-21 RX ORDER — BUPROPION HYDROCHLORIDE 75 MG/1
75 TABLET ORAL 2 TIMES DAILY
COMMUNITY

## 2020-12-21 NOTE — PROGRESS NOTES
Chief Complaint-rash    Chief Complaint   Patient presents with   • New Patient     Elise Pozo is a 55 y.o. 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 Lesions mostly on scalp and upper arms. Patient has had these lesions for 5 years ago, previously diagnosed as sun damaged and treated with cryosurgery. Patient denies iritis/ uveitis/ positive intermittent anemia, no cardiac issues no bladder issues no lung issues.  Patient last saw Dermatology about 11/2020, currently uses topical triamcinolone ointment as needed      PMHX  Irritable bowel  Chollecystectomy  Dx MAST cell disorder, was offered bone marrow bx-patient refused, currently takes daily antihistamine, followed by hematology    FAM HX  M-A/W  F-dementia  Patient only child    SOC HX  Positive tob quit 1994  Positive chew  ETOH q week  No blood tx  No tattoos    Of note, patient currently on alprazolam, as benzodiazepines are considered high risk medications we will not be able to prescribe any narcotic pain medications for this patient in this clinic.      Current medicines (including changes today)  Current Outpatient Medications   Medication Sig Dispense Refill   • buPROPion (WELLBUTRIN) 75 MG Tab Take 75 mg by mouth 2 times a day.     • escitalopram (LEXAPRO) 10 MG Tab Take 5 mg by mouth every evening.     • ALPRAZolam (XANAX) 0.5 MG Tab Take 0.5 mg by mouth at bedtime as needed for Sleep.     • fexofenadine (ALLEGRA) 60 MG Tab Take 120 mg by mouth 2 times a day.     • conjugated estrogen (PREMARIN) 0.625 MG Tab Take 0.625 mg by mouth every day.     • colestipol (COLESTID) 1 GM Tab Take 2 g by mouth every evening.     • sulfamethoxazole-trimethoprim (BACTRIM DS) 800-160 MG tablet Take 1 Tab  by mouth 2 times a day. Second 7 day course for a staph infection, per pt. Finished >2 weeks ago, reported 4/28/18       No current facility-administered medications for this visit.      She  has no past medical history on file.  She  has a past surgical history that includes gyn surgery.  History reviewed. No pertinent family history.  No family status information on file.     Social History     Tobacco Use   • Smoking status: Former Smoker   • Smokeless tobacco: Never Used   Substance Use Topics   • Alcohol use: Yes   • Drug use: No     Social History     Social History Narrative   • Not on file       ROS   Other than what is mentioned in HPI or physical exam, there is no history of headaches, double vision or blurred vision. No temporal tenderness or jaw claudication.  No trouble swallowing difficulties or sore throats.  No chest complaints including chest pain, cough or sputum production. No GI complaints including nausea, vomiting, change in bowel habits, or past peptic ulcer disease. No history of blood in the stools. No urinary complaints including dysuria or frequency. No history of alopecia, photosensitivity, oral ulcerations, Raynaud's phenomena.       Objective:     /60   Pulse 86   Temp 36.3 °C (97.3 °F) (Temporal)   Resp 14   Wt 57.2 kg (126 lb)   SpO2 98%  Body mass index is 23.05 kg/m².  Physical Exam:    Constitutional: Alert and oriented X3, no distress.Skin: Warm, dry, good turgor, patient has a small flat erythematous nonraised nonflaky area along the middle part middle of head, patient has very one small about 2 mm diameter raised flaky lesion lateral aspect of left upper arm, patient also with about a 1 cm lesion raised flaky lesion dorsal aspect of right great toe  Eye: Equal, round and reactive, conjunctiva clear, lids normal EOM intactENMT: Lips without lesions, good dentition, no oropharyngeal ulcers, moist buccal mucosa, pinna without deformityNeck: Trachea midline, no masses, no  thyromegaly.Lymph:  No cervical lymphadenopathy, no axillary lymphadenopathy, no supraclavicular lymphadenopathyRespiratory: Unlabored respiratory effort, lungs clear to auscultation, no wheezes, no ronchi.Cardiovascular: Normal S1, S2, no murmur, no edema.Abdomen: Soft, non-tender, no masses, no hepatosplenomegaly.Psych: Alert and oriented x3, normal affect and mood.Neuro Cranial nerves 2-12 are grossly intact, no loss of sensation LEExt:no joint laxity noted in bilateral arms, no joint laxity noted in bilateral legs, no Raynaud's no digital tip ulcerations no petechial lesions    Lab Results   Component Value Date/Time    SODIUM 136 04/28/2018 08:50 PM    POTASSIUM 3.5 (L) 04/28/2018 08:50 PM    CHLORIDE 101 04/28/2018 08:50 PM    CO2 26 04/28/2018 08:50 PM    GLUCOSE 126 (H) 04/28/2018 08:50 PM    BUN 13 04/28/2018 08:50 PM    CREATININE 0.82 04/28/2018 08:50 PM      Lab Results   Component Value Date/Time    WBC 7.3 04/28/2018 08:50 PM    RBC 4.63 04/28/2018 08:50 PM    HEMOGLOBIN 14.8 04/28/2018 08:50 PM    HEMATOCRIT 42.6 04/28/2018 08:50 PM    MCV 92.0 04/28/2018 08:50 PM    MCH 32.0 04/28/2018 08:50 PM    MCHC 34.7 04/28/2018 08:50 PM    MPV 10.0 04/28/2018 08:50 PM    NEUTSPOLYS 64.70 04/28/2018 08:50 PM    LYMPHOCYTES 28.20 04/28/2018 08:50 PM    MONOCYTES 5.90 04/28/2018 08:50 PM    EOSINOPHILS 0.50 04/28/2018 08:50 PM    BASOPHILS 0.40 04/28/2018 08:50 PM      Lab Results   Component Value Date/Time    CALCIUM 9.4 04/28/2018 08:50 PM    ASTSGOT 14 04/28/2018 08:50 PM    ALTSGPT 9 04/28/2018 08:50 PM    ALKPHOSPHAT 51 04/28/2018 08:50 PM    TBILIRUBIN 0.5 04/28/2018 08:50 PM    ALBUMIN 4.4 04/28/2018 08:50 PM    TOTPROTEIN 7.7 04/28/2018 08:50 PM     Lab Results   Component Value Date/Time    HBA1C 5.0 04/28/2018 08:50 PM     Lab Results   Component Value Date/Time    TSHULTRASEN 1.640 04/28/2018 08:50 PM      Results for orders placed during the hospital encounter of 04/28/18   Echocardiogram Comp W/O  cont     Assessment and Plan:  1. Discoid lupus  Per patient report we do not have any results from patient's dermatologist, we will solicit for those results, will also solicit for previous labs from [facility] patient states she received an order by her dermatologist, in the meantime we will do additional lupus laboratories.  If biopsy does indicate SLE discoid then may start Plaquenil, long discussion with patient regarding treatment options information from up-to-date printed out given to patient a regarding Plaquenil  - SMITH AB IGG; Future  - SSA 52 AND 60 (RO)(PEE) AB, IGG; Future  - SSB(LA)(PEE)IGG AB; Future  - DSDNA AB, IGG W/RFLX TO IFA TITER; Future  - COMPLEMENT C3+C4 SERUM; Future  - G6PD QUANT + RBC; Future    2. Nicotine abuse  Patient chews tobacco  Tobacco abuse is known to exacerbate rheumatoid arthritis, 20 minute discussion with patient regarding the need to stop tobacco abuse, patient states understanding    3. Long-term use of Plaquenil  If biopsy of scalp comes back discoid SLE then may start Plaquenil, today we will check a G6PD levels, advised patient to get her eyes examined for baseline, patient states she will  - G6PD QUANT + RBC; Future    Records requested.  Followup: Return in about 2 weeks (around 1/4/2021). or sooner prn  Patient was seen 60 minutes face-to-face (excluding time for procedures)  of which more than 50% the time was spent counseling the patient regarding  rheumatological conditions and care. Therapy was discussed in detail.  Thank you for this referral.    Please note that this dictation was created using voice recognition software. I have made every reasonable attempt to correct obvious errors, but I expect that there are errors of grammar and possibly content that I did not discover before finalizing the note.

## 2020-12-21 NOTE — ASSESSMENT & PLAN NOTE
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 Lesions mostly on scalp and upper arms. Patient has had these lesions for 5 years ago, previously diagnosed as sun damaged. Patient denies iritis/ uveitis/ positive intermittent anemia  Patient last saw Dermatology about 11/2020      PMHX  Irritable bowel  Chollecystectomy  Dx MAST cell disorder, was offered bone marrow bx-patient refused, currently takes daily antihistamine    FAM HX  M-A/W  F-dementia  Patient only child    SOC HX  Positive tob quit 1994  Positive chew  ETOH q week  No blood tx  No tattoos

## 2021-01-05 ENCOUNTER — APPOINTMENT (RX ONLY)
Dept: URBAN - METROPOLITAN AREA CLINIC 22 | Facility: CLINIC | Age: 56
Setting detail: DERMATOLOGY
End: 2021-01-05

## 2021-01-05 DIAGNOSIS — L93.0 DISCOID LUPUS ERYTHEMATOSUS: ICD-10-CM

## 2021-01-05 PROCEDURE — ? COUNSELING

## 2021-01-05 PROCEDURE — ? ADDITIONAL NOTES

## 2021-01-05 PROCEDURE — 99214 OFFICE O/P EST MOD 30 MIN: CPT

## 2021-01-05 PROCEDURE — ? PRESCRIPTION

## 2021-01-05 RX ORDER — TRIAMCINOLONE ACETONIDE 1 MG/G
1 CREAM TOPICAL BID
Qty: 1 | Refills: 3 | Status: ERX

## 2021-01-05 ASSESSMENT — LOCATION SIMPLE DESCRIPTION DERM: LOCATION SIMPLE: LEFT SCALP

## 2021-01-05 ASSESSMENT — LOCATION DETAILED DESCRIPTION DERM: LOCATION DETAILED: LEFT MEDIAL FRONTAL SCALP

## 2021-01-05 ASSESSMENT — LOCATION ZONE DERM: LOCATION ZONE: SCALP

## 2021-01-05 NOTE — PROCEDURE: ADDITIONAL NOTES
Detail Level: Detailed
Render Risk Assessment In Note?: no
Additional Notes: Patient has established with Rheum.   \\nReferral notes reviewed in detail/recommendations by Dr. Romero include starting Plaquenil once further testing received.   Patient has dilated eye exam scheduled for Jan 12th. \\nLabs and office notes sent to Dr. Romero’s office as well as copies provided to patient. \\nShe notes intermittent use of topical scalp solution but also feels that her scalp has not been as itchy or bothersome since initial treatment with ILK.    Does feel she gets a few lesions on her cheeks which are consistent with presentation.  She does have tac cream which was told ok to use BID very locally to lesion for for 7-10 days.  Advised sunscreen and discussed SEs of steroid use.    \\nPrevious labs reviewed

## 2021-01-11 ENCOUNTER — OFFICE VISIT (OUTPATIENT)
Dept: RHEUMATOLOGY | Facility: MEDICAL CENTER | Age: 56
End: 2021-01-11
Payer: OTHER GOVERNMENT

## 2021-01-11 VITALS
DIASTOLIC BLOOD PRESSURE: 60 MMHG | BODY MASS INDEX: 23.23 KG/M2 | SYSTOLIC BLOOD PRESSURE: 110 MMHG | OXYGEN SATURATION: 97 % | WEIGHT: 127 LBS | HEART RATE: 88 BPM | TEMPERATURE: 97.4 F | RESPIRATION RATE: 12 BRPM

## 2021-01-11 DIAGNOSIS — Z79.899 LONG-TERM USE OF PLAQUENIL: ICD-10-CM

## 2021-01-11 DIAGNOSIS — Z72.0 NICOTINE ABUSE: ICD-10-CM

## 2021-01-11 DIAGNOSIS — L93.0 DISCOID LUPUS: ICD-10-CM

## 2021-01-11 PROCEDURE — 99214 OFFICE O/P EST MOD 30 MIN: CPT | Performed by: INTERNAL MEDICINE

## 2021-01-11 RX ORDER — HYDROXYCHLOROQUINE SULFATE 200 MG/1
TABLET, FILM COATED ORAL
Qty: 135 TAB | Refills: 1 | Status: SHIPPED | OUTPATIENT
Start: 2021-01-11 | End: 2021-10-25 | Stop reason: SDUPTHER

## 2021-01-11 NOTE — PROGRESS NOTES
Chief Complaint- joint pain     Subjective:   Elise Pozo is a 55 y.o. 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  Patient status post intralesional corticosteroid injections and uses topical corticosteroid injections with benefit, patient here to see about possibly starting systemic treatment for such.  Patient denies any other findings of systemic lupus including no iritis or uveitis, patient has have intermittent anemia no cardiac issues no lung issues, patient serologies negative other than a low positive RNP.          PMHX  Irritable bowel  Chollecystectomy  Dx MAST cell disorder, was offered bone marrow bx-patient refused, currently takes daily antihistamine, followed by hematology      SOC HX  Positive tob quit 1994  Positive chew  ETOH q week  No blood tx  No tattoos     Of note, patient currently on alprazolam, as benzodiazepines are considered high risk medications we will not be able to prescribe any narcotic pain medications for this patient in this clinic.      GG6PD 14.1 adequate 1/2011 [facility]  dsDNA neg 9/2020 [facility]; dsDNA neg 1/2011 [facility]  SSA neg 9/2020 [facility]; SSA neg 1/2011 [facility]  SSB neg 9/2020 [facility]; SSB neg 1/2011 [facility]  Howard neg 9/2020 [facility]; Howard neg 1/2011 [facility]  C3 134 nl 1/2011 [facility]  C4 21 nl [facility]  RNP 1.1 9/2020 [facility]              Current medicines (including changes today)  Current Outpatient Medications   Medication Sig Dispense Refill   • hydroxychloroquine (PLAQUENIL) 200 MG Tab 1 1/2 tabs po qday 135 Tab 1   • buPROPion (WELLBUTRIN) 75 MG Tab Take 75 mg by mouth 2 times a day.     • escitalopram (LEXAPRO) 10 MG Tab Take 5 mg by mouth every evening.     • ALPRAZolam (XANAX) 0.5 MG Tab Take 0.5 mg by mouth at bedtime as needed for Sleep.     • fexofenadine (ALLEGRA) 60 MG Tab Take 120 mg by  mouth 2 times a day.     • conjugated estrogen (PREMARIN) 0.625 MG Tab Take 0.625 mg by mouth every day.     • colestipol (COLESTID) 1 GM Tab Take 2 g by mouth every evening.     • sulfamethoxazole-trimethoprim (BACTRIM DS) 800-160 MG tablet Take 1 Tab by mouth 2 times a day. Second 7 day course for a staph infection, per pt. Finished >2 weeks ago, reported 4/28/18       No current facility-administered medications for this visit.      She  has no past medical history on file.    ROS   Other than what is mentioned in HPI or physical exam, there is no history of headaches, double vision or blurred vision. No temporal tenderness or jaw claudication. No trouble swallowing difficulties or sore throats.  No chest complaints including chest pain, cough or sputum production. No GI complaints including nausea, vomiting, change in bowel habits, or past peptic ulcer disease. No history of blood in the stools. No urinary complaints including dysuria or frequency. No history of alopecia, photosensitivity, oral ulcerations, Raynaud's phenomena.       Objective:     /60   Pulse 88   Temp 36.3 °C (97.4 °F) (Temporal)   Resp 12   Wt 57.6 kg (127 lb)   SpO2 97%  Body mass index is 23.23 kg/m².   Physical Exam:    Constitutional: Alert and oriented X3, patient is talkative with good eye contact.Skin: Warm, dry, good turgor, no rashes in visible areas, very mild erythema on the scalp some very mild flaky skin about 1 cm diameter, did not appreciate any scarring, no raised lesions in the scalp with no patchy alopecia.Eye: Equal, round and reactive, conjunctiva clear, lids normal EOM intactENMT: Lips without lesions, good dentition, no oropharyngeal ulcers, moist buccal mucosa, pinna without deformityNeck: Trachea midline, no masses, no thyromegaly.Lymph:  No cervical lymphadenopathy, no axillary lymphadenopathy, no supraclavicular lymphadenopathyRespiratory: Unlabored respiratory effort, lungs clear to auscultation, no  wheezes, no ronchi.Cardiovascular: Normal S1, S2, no murmur, no edema.Abdomen: Soft, non-tender, no masses, no hepatosplenomegaly.Psych: Alert and oriented x3, normal affect and mood.Neuro: Cranial nerves 2-12 are grossly intact, no loss of sensation LEExt:no joint laxity noted in bilateral arms, no joint laxity noted in bilateral legs, no dactylitis no Raynaud's no digital tip ulcerations no periungual erythema no lower extremity edema    Lab Results   Component Value Date/Time    SODIUM 136 04/28/2018 08:50 PM    POTASSIUM 3.5 (L) 04/28/2018 08:50 PM    CHLORIDE 101 04/28/2018 08:50 PM    CO2 26 04/28/2018 08:50 PM    GLUCOSE 126 (H) 04/28/2018 08:50 PM    BUN 13 04/28/2018 08:50 PM    CREATININE 0.82 04/28/2018 08:50 PM      Lab Results   Component Value Date/Time    WBC 7.3 04/28/2018 08:50 PM    RBC 4.63 04/28/2018 08:50 PM    HEMOGLOBIN 14.8 04/28/2018 08:50 PM    HEMATOCRIT 42.6 04/28/2018 08:50 PM    MCV 92.0 04/28/2018 08:50 PM    MCH 32.0 04/28/2018 08:50 PM    MCHC 34.7 04/28/2018 08:50 PM    MPV 10.0 04/28/2018 08:50 PM    NEUTSPOLYS 64.70 04/28/2018 08:50 PM    LYMPHOCYTES 28.20 04/28/2018 08:50 PM    MONOCYTES 5.90 04/28/2018 08:50 PM    EOSINOPHILS 0.50 04/28/2018 08:50 PM    BASOPHILS 0.40 04/28/2018 08:50 PM      Lab Results   Component Value Date/Time    CALCIUM 9.4 04/28/2018 08:50 PM    ASTSGOT 14 04/28/2018 08:50 PM    ALTSGPT 9 04/28/2018 08:50 PM    ALKPHOSPHAT 51 04/28/2018 08:50 PM    TBILIRUBIN 0.5 04/28/2018 08:50 PM    ALBUMIN 4.4 04/28/2018 08:50 PM    TOTPROTEIN 7.7 04/28/2018 08:50 PM     Lab Results   Component Value Date/Time    HBA1C 5.0 04/28/2018 08:50 PM       Assessment and Plan:     1. Discoid lupus  Very mild, pretty well controlled with topical ointments, patient interested in trying oral treatments will do trial of Plaquenil 300 mg p.o. daily with dose based on weight at 5 mg/kg  - hydroxychloroquine (PLAQUENIL) 200 MG Tab; 1 1/2 tabs po qday  Dispense: 135 Tab; Refill:  1    2. Long-term use of Plaquenil  Start Plaquenil 300 mg p.o. daily with dose based on weight,  Formation from up-to-date printed out and given to patient to review today  Of note G6PD levels adequate by [facility]  Patient has ophthalmology valuation pending in about 1 week.  - hydroxychloroquine (PLAQUENIL) 200 MG Tab; 1 1/2 tabs po qday  Dispense: 135 Tab; Refill: 1    3. Nicotine abuse  Patient does chew  Tobacco abuse is known to exacerbate rheumatoid arthritis, 20 minute discussion with patient regarding the need to stop tobacco abuse, patient states understanding    4. depression  Currently on Wellbutrin, Lexapro and Xanax    Followup: Return in about 4 months (around 5/11/2021). or sooner prn    Elise Pozo  was seen 30 minutes face-to-face of which more than 50% of the time was spent counseling the patient (excluding time for procedures)  regarding  rheumatological condition and care. Therapy was discussed in detail.      Please note that this dictation was created using voice recognition software. I have made every reasonable attempt to correct obvious errors, but I expect that there are errors of grammar and possibly content that I did not discover before finalizing the note.

## 2021-02-21 ENCOUNTER — OFFICE VISIT (OUTPATIENT)
Dept: URGENT CARE | Facility: CLINIC | Age: 56
End: 2021-02-21
Payer: OTHER GOVERNMENT

## 2021-02-21 VITALS
SYSTOLIC BLOOD PRESSURE: 116 MMHG | RESPIRATION RATE: 20 BRPM | HEIGHT: 62 IN | HEART RATE: 94 BPM | BODY MASS INDEX: 23 KG/M2 | WEIGHT: 125 LBS | DIASTOLIC BLOOD PRESSURE: 66 MMHG | OXYGEN SATURATION: 98 % | TEMPERATURE: 98.5 F

## 2021-02-21 DIAGNOSIS — S61.213D LACERATION OF LEFT MIDDLE FINGER WITHOUT FOREIGN BODY WITHOUT DAMAGE TO NAIL, SUBSEQUENT ENCOUNTER: ICD-10-CM

## 2021-02-21 DIAGNOSIS — R68.83 CHILLS: ICD-10-CM

## 2021-02-21 DIAGNOSIS — R53.83 OTHER FATIGUE: ICD-10-CM

## 2021-02-21 LAB
APPEARANCE UR: CLEAR
BILIRUB UR STRIP-MCNC: NORMAL MG/DL
COLOR UR AUTO: NORMAL
GLUCOSE UR STRIP.AUTO-MCNC: NORMAL MG/DL
KETONES UR STRIP.AUTO-MCNC: NORMAL MG/DL
LEUKOCYTE ESTERASE UR QL STRIP.AUTO: NORMAL
NITRITE UR QL STRIP.AUTO: NORMAL
PH UR STRIP.AUTO: 7 [PH] (ref 5–8)
PROT UR QL STRIP: NORMAL MG/DL
RBC UR QL AUTO: NORMAL
SP GR UR STRIP.AUTO: 1.01
UROBILINOGEN UR STRIP-MCNC: 0.2 MG/DL

## 2021-02-21 PROCEDURE — 81002 URINALYSIS NONAUTO W/O SCOPE: CPT | Performed by: PHYSICIAN ASSISTANT

## 2021-02-21 PROCEDURE — 99213 OFFICE O/P EST LOW 20 MIN: CPT | Performed by: PHYSICIAN ASSISTANT

## 2021-02-21 ASSESSMENT — ENCOUNTER SYMPTOMS
EYE REDNESS: 0
COUGH: 0
WHEEZING: 0
CHILLS: 1
EYE DISCHARGE: 0
HEADACHES: 0
DIARRHEA: 0
FEVER: 0
VOMITING: 0
NAUSEA: 0
SHORTNESS OF BREATH: 0
FATIGUE: 1
CHANGE IN BOWEL HABIT: 0
SORE THROAT: 0

## 2021-02-21 NOTE — PROGRESS NOTES
"Subjective:      Elise Pozo is a 55 y.o. female who presents with Fever (Left middle finger, believes has an infection from a finger laceration on 02/16/2021, unsure if delayed reaction, urethral discomfort)            Patient is a 55-year-old female who presents to urgent care concern regarding recent reading on her thermometer this morning of 102.  Patient reports that she awoke feeling \"rundown \"with an episode of feeling cold and warm prompting her to take her temperature.  Patient has a thermometer \"gun\", of which read 102 prompting evaluation for check of her temperature.  She does report remote history of laceration repair on 2-16 after cutting herself with a sharp knife.  She reports that this was sutured by her PCP of which she continues to deny any drainage, redness or noted increased pain.  She also reports at that time she was given a Tdap vaccine however was sore the day and the day after however this since has resolved.  Furthermore she denies any vomiting, diarrhea, cough or congestion.  She does report intermittent \"urethral pain \"and is requesting to have a urinalysis today.  She reports not having a UTI in years and reports that she will have strange \"phantom\" pain.  Otherwise denies other source of infection.  She was recently written Plaquenil for her discoid lupus however has yet to start such.  Of note she did not take anything for her fevers.    Other  This is a new problem. The current episode started today. The problem occurs constantly. Associated symptoms include chills and fatigue. Pertinent negatives include no change in bowel habit, congestion, coughing, fever, headaches, nausea, sore throat or vomiting. Nothing aggravates the symptoms. She has tried nothing for the symptoms.       Review of Systems   Constitutional: Positive for chills, fatigue and malaise/fatigue. Negative for fever.   HENT: Negative for congestion, ear pain and sore throat.    Eyes: Negative for discharge " "and redness.   Respiratory: Negative for cough, shortness of breath and wheezing.    Gastrointestinal: Negative for change in bowel habit, diarrhea, nausea and vomiting.   Skin:        History of rash however none new.  History of discoid lupus.   Neurological: Negative for headaches.   All other systems reviewed and are negative.         Objective:     /66 (BP Location: Right arm, Patient Position: Sitting, BP Cuff Size: Adult)   Pulse 94   Temp 36.9 °C (98.5 °F) (Temporal)   Resp 20   Ht 1.575 m (5' 2\")   Wt 56.7 kg (125 lb)   SpO2 98%   BMI 22.86 kg/m²    PMH:  has no past medical history on file.  MEDS: Reviewed .   ALLERGIES:   Allergies   Allergen Reactions   • Demerol Anaphylaxis   • Morphine Anaphylaxis   • Ciprofloxacin Rash     red   • Clindamycin    • Pcn [Penicillins] Rash     Red  PATIENT STATES SHE CAN TAKE AMOXICILLIN       SURGHX:   Past Surgical History:   Procedure Laterality Date   • GYN SURGERY       SOCHX:  reports that she has quit smoking. She has never used smokeless tobacco. She reports previous alcohol use. She reports that she does not use drugs.  FH: Family history was reviewed, no pertinent findings to report    Physical Exam  Vitals reviewed.   Constitutional:       General: She is not in acute distress.     Appearance: She is well-developed.   HENT:      Head: Normocephalic and atraumatic.      Right Ear: External ear normal.      Left Ear: External ear normal.      Mouth/Throat:      Pharynx: No oropharyngeal exudate.   Eyes:      Conjunctiva/sclera: Conjunctivae normal.      Pupils: Pupils are equal, round, and reactive to light.   Neck:      Trachea: No tracheal deviation.   Cardiovascular:      Rate and Rhythm: Normal rate and regular rhythm.      Heart sounds: No murmur.   Pulmonary:      Effort: Pulmonary effort is normal. No respiratory distress.      Breath sounds: Normal breath sounds.   Musculoskeletal:         General: Normal range of motion.      Cervical back: " Normal range of motion and neck supple.   Skin:     General: Skin is warm.      Findings: Rash present.             Comments: Erythematous patches to bilateral maxillary regions.  Left hand-third digit with well-healing wound with sutures intact.  Minimal maceration without surrounding redness, drainage or tenderness.  Without increased warmth.  Without streaking.   Neurological:      Mental Status: She is alert and oriented to person, place, and time.      Coordination: Coordination normal.   Psychiatric:         Behavior: Behavior normal.         Thought Content: Thought content normal.         Judgment: Judgment normal.                 Assessment/Plan:        1. Other fatigue  - COVID/SARS CoV-2 PCR; Future    2. Chills  - COVID/SARS CoV-2 PCR; Future    3. Laceration of left middle finger without foreign body without damage to nail, subsequent encounter    Urinalysis: Clear  Patient's finger does not appear infected at this time with no evidence of streaking, increased warmth or drainage.  Patient's urinalysis is also clear.  Patient is afebrile in clinic without noted tachycardia or further evidence of infection source.  Encourage patient to monitor symptoms we will order Covid for patient to have done in 2 days if she progresses with symptoms.  Patient is to return to clinic for any change or new symptoms.  Uncertain if reading from thermometer was erroneous however again afebrile in clinic today.  Patient was agreeable to the plan and will monitor, discussed worrisome signs and symptoms of laceration indicating infection.  Appropriate PPE worn at all times by provider.   Pt. Had face mask on throughout entirety of the visit other than oropharyngeal examination today.     DDX, Supportive care, and indications for immediate follow-up discussed with patient.    Instructed to return to clinic or nearest emergency department if we are not available for any change in condition, further concerns, or worsening of  symptoms.    The patient and/or guardian demonstrated a good understanding and agreed with the treatment plan.    Please note that this dictation was created using voice recognition software. I have made every reasonable attempt to correct obvious errors, but I expect that there are errors of grammar and possibly content that I did not discover before finalizing the note.

## 2021-04-26 ENCOUNTER — TELEPHONE (OUTPATIENT)
Dept: RHEUMATOLOGY | Facility: MEDICAL CENTER | Age: 56
End: 2021-04-26

## 2021-04-26 NOTE — TELEPHONE ENCOUNTER
Pt has Discoid Lupus.     Dentist office needs to do a crown prep for this pt and is wondering if she needs pre-meds and is Epi ok to give this pt.?   Please advise and thank you

## 2021-04-26 NOTE — TELEPHONE ENCOUNTER
From a RHEUMATOLOGICAL standpoint, patient does not need antibiotics and okay to give epinephrine.

## 2021-05-11 ENCOUNTER — APPOINTMENT (OUTPATIENT)
Dept: RHEUMATOLOGY | Facility: MEDICAL CENTER | Age: 56
End: 2021-05-11
Payer: OTHER GOVERNMENT

## 2021-06-04 ENCOUNTER — APPOINTMENT (RX ONLY)
Dept: URBAN - METROPOLITAN AREA CLINIC 22 | Facility: CLINIC | Age: 56
Setting detail: DERMATOLOGY
End: 2021-06-04

## 2021-06-04 DIAGNOSIS — L82.1 OTHER SEBORRHEIC KERATOSIS: ICD-10-CM

## 2021-06-04 DIAGNOSIS — L93.0 DISCOID LUPUS ERYTHEMATOSUS: ICD-10-CM

## 2021-06-04 DIAGNOSIS — D22 MELANOCYTIC NEVI: ICD-10-CM

## 2021-06-04 DIAGNOSIS — L81.4 OTHER MELANIN HYPERPIGMENTATION: ICD-10-CM

## 2021-06-04 DIAGNOSIS — D18.0 HEMANGIOMA: ICD-10-CM

## 2021-06-04 DIAGNOSIS — Z71.89 OTHER SPECIFIED COUNSELING: ICD-10-CM

## 2021-06-04 PROBLEM — D22.72 MELANOCYTIC NEVI OF LEFT LOWER LIMB, INCLUDING HIP: Status: ACTIVE | Noted: 2021-06-04

## 2021-06-04 PROBLEM — D22.5 MELANOCYTIC NEVI OF TRUNK: Status: ACTIVE | Noted: 2021-06-04

## 2021-06-04 PROBLEM — D18.01 HEMANGIOMA OF SKIN AND SUBCUTANEOUS TISSUE: Status: ACTIVE | Noted: 2021-06-04

## 2021-06-04 PROCEDURE — ? PRESCRIPTION

## 2021-06-04 PROCEDURE — ? SUNSCREEN RECOMMENDATIONS

## 2021-06-04 PROCEDURE — ? ADDITIONAL NOTES

## 2021-06-04 PROCEDURE — ? COUNSELING

## 2021-06-04 PROCEDURE — ? DIAGNOSIS COMMENT

## 2021-06-04 PROCEDURE — 99214 OFFICE O/P EST MOD 30 MIN: CPT

## 2021-06-04 RX ORDER — TRIAMCINOLONE ACETONIDE 1 MG/G
1 CREAM TOPICAL BID
Qty: 1 | Refills: 3 | Status: ERX

## 2021-06-04 RX ORDER — TACROLIMUS 1 MG/G
1 OINTMENT TOPICAL QD
Qty: 1 | Refills: 2 | Status: ERX | COMMUNITY
Start: 2021-06-04

## 2021-06-04 RX ORDER — FLUOCINONIDE 0.5 MG/ML
1 SOLUTION TOPICAL BID
Qty: 1 | Refills: 2 | Status: ERX

## 2021-06-04 RX ADMIN — TACROLIMUS 1: 1 OINTMENT TOPICAL at 00:00

## 2021-06-04 ASSESSMENT — LOCATION SIMPLE DESCRIPTION DERM
LOCATION SIMPLE: RIGHT CHEEK
LOCATION SIMPLE: LEFT UPPER BACK
LOCATION SIMPLE: RIGHT UPPER BACK
LOCATION SIMPLE: LEFT FOREARM
LOCATION SIMPLE: PLANTAR SURFACE OF LEFT 4TH TOE
LOCATION SIMPLE: ABDOMEN
LOCATION SIMPLE: LEFT SCALP

## 2021-06-04 ASSESSMENT — LOCATION ZONE DERM
LOCATION ZONE: FACE
LOCATION ZONE: SCALP
LOCATION ZONE: TOE
LOCATION ZONE: TRUNK
LOCATION ZONE: ARM

## 2021-06-04 ASSESSMENT — LOCATION DETAILED DESCRIPTION DERM
LOCATION DETAILED: LEFT MEDIAL FRONTAL SCALP
LOCATION DETAILED: LEFT PROXIMAL DORSAL FOREARM
LOCATION DETAILED: RIGHT MID-UPPER BACK
LOCATION DETAILED: RIGHT CENTRAL MALAR CHEEK
LOCATION DETAILED: LEFT INFERIOR UPPER BACK
LOCATION DETAILED: LEFT MEDIAL PLANTAR 4TH TOE
LOCATION DETAILED: LEFT LATERAL ABDOMEN

## 2021-06-04 ASSESSMENT — SEVERITY ASSESSMENT: SEVERITY: MILD TO MODERATE

## 2021-06-04 NOTE — PROCEDURE: ADDITIONAL NOTES
Detail Level: Detailed
Render Risk Assessment In Note?: no
Additional Notes: Patient notes flare after d/c plaquenil and topicals.  Scalp itchy and red but no hair loss.  She is willing to start plaquenil again and continue to follow with rheum.   \\nPt. notes diligence w/sun protection.

## 2021-10-25 ENCOUNTER — TELEPHONE (OUTPATIENT)
Dept: RHEUMATOLOGY | Facility: MEDICAL CENTER | Age: 56
End: 2021-10-25

## 2021-10-25 DIAGNOSIS — Z79.899 LONG-TERM USE OF PLAQUENIL: ICD-10-CM

## 2021-10-25 DIAGNOSIS — L93.0 DISCOID LUPUS: ICD-10-CM

## 2021-10-25 RX ORDER — HYDROXYCHLOROQUINE SULFATE 200 MG/1
TABLET, FILM COATED ORAL
Qty: 135 TABLET | Refills: 0 | Status: SHIPPED | OUTPATIENT
Start: 2021-10-25

## 2021-10-25 NOTE — TELEPHONE ENCOUNTER
Please notify patient that we received a refill request for Plaquenil to [facility], 3-month refill was sent in the patient is due for labs    Labs have been ordered in the computer patient does not need to be fasting

## 2021-10-27 ENCOUNTER — RX ONLY (OUTPATIENT)
Age: 56
Setting detail: RX ONLY
End: 2021-10-27

## 2021-10-27 RX ORDER — TACROLIMUS 1 MG/G
OINTMENT TOPICAL
Qty: 90 | Refills: 2 | Status: ERX | COMMUNITY
Start: 2021-10-27

## 2021-10-27 RX ORDER — TACROLIMUS 1 MG/G
OINTMENT TOPICAL
Qty: 90 | Refills: 2 | Status: ERX

## 2022-06-13 ENCOUNTER — APPOINTMENT (RX ONLY)
Dept: URBAN - METROPOLITAN AREA CLINIC 22 | Facility: CLINIC | Age: 57
Setting detail: DERMATOLOGY
End: 2022-06-13

## 2022-06-13 DIAGNOSIS — L93.0 DISCOID LUPUS ERYTHEMATOSUS: ICD-10-CM

## 2022-06-13 DIAGNOSIS — Z71.89 OTHER SPECIFIED COUNSELING: ICD-10-CM

## 2022-06-13 DIAGNOSIS — D22 MELANOCYTIC NEVI: ICD-10-CM

## 2022-06-13 DIAGNOSIS — D18.0 HEMANGIOMA: ICD-10-CM

## 2022-06-13 DIAGNOSIS — L82.1 OTHER SEBORRHEIC KERATOSIS: ICD-10-CM

## 2022-06-13 DIAGNOSIS — L81.4 OTHER MELANIN HYPERPIGMENTATION: ICD-10-CM

## 2022-06-13 PROBLEM — D22.72 MELANOCYTIC NEVI OF LEFT LOWER LIMB, INCLUDING HIP: Status: ACTIVE | Noted: 2022-06-13

## 2022-06-13 PROBLEM — D18.01 HEMANGIOMA OF SKIN AND SUBCUTANEOUS TISSUE: Status: ACTIVE | Noted: 2022-06-13

## 2022-06-13 PROBLEM — D22.5 MELANOCYTIC NEVI OF TRUNK: Status: ACTIVE | Noted: 2022-06-13

## 2022-06-13 PROCEDURE — ? PRESCRIPTION

## 2022-06-13 PROCEDURE — ? ADDITIONAL NOTES

## 2022-06-13 PROCEDURE — ? DIAGNOSIS COMMENT

## 2022-06-13 PROCEDURE — ? SUNSCREEN RECOMMENDATIONS

## 2022-06-13 PROCEDURE — 99214 OFFICE O/P EST MOD 30 MIN: CPT

## 2022-06-13 PROCEDURE — ? COUNSELING

## 2022-06-13 RX ORDER — TACROLIMUS 1 MG/G
1 OINTMENT TOPICAL QD
Qty: 60 | Refills: 2 | Status: ERX | COMMUNITY
Start: 2022-06-13

## 2022-06-13 RX ADMIN — TACROLIMUS 1: 1 OINTMENT TOPICAL at 00:00

## 2022-06-13 ASSESSMENT — LOCATION DETAILED DESCRIPTION DERM
LOCATION DETAILED: LEFT LATERAL ABDOMEN
LOCATION DETAILED: LEFT INFERIOR UPPER BACK
LOCATION DETAILED: RIGHT MID-UPPER BACK
LOCATION DETAILED: LEFT MEDIAL FRONTAL SCALP
LOCATION DETAILED: RIGHT CENTRAL MALAR CHEEK
LOCATION DETAILED: LEFT PROXIMAL DORSAL FOREARM
LOCATION DETAILED: LEFT MEDIAL PLANTAR 4TH TOE

## 2022-06-13 ASSESSMENT — LOCATION ZONE DERM
LOCATION ZONE: ARM
LOCATION ZONE: TRUNK
LOCATION ZONE: SCALP
LOCATION ZONE: FACE
LOCATION ZONE: TOE

## 2022-06-13 ASSESSMENT — LOCATION SIMPLE DESCRIPTION DERM
LOCATION SIMPLE: RIGHT CHEEK
LOCATION SIMPLE: RIGHT UPPER BACK
LOCATION SIMPLE: LEFT UPPER BACK
LOCATION SIMPLE: LEFT FOREARM
LOCATION SIMPLE: ABDOMEN
LOCATION SIMPLE: LEFT SCALP
LOCATION SIMPLE: PLANTAR SURFACE OF LEFT 4TH TOE

## 2022-06-13 NOTE — PROCEDURE: ADDITIONAL NOTES
Detail Level: Simple
Additional Notes: Currently flared on cheeks today. \\nPatient admits she is not as consistent with the tacrolimus.  She feels this works better than the topical CSs.  \\nNo current issues on scalp and hair has grown back. \\nIs also on hydroxychloroquine.   Rheum is now Dr. Avendaño.
Render Risk Assessment In Note?: no

## 2022-10-29 ENCOUNTER — APPOINTMENT (OUTPATIENT)
Dept: URGENT CARE | Facility: CLINIC | Age: 57
End: 2022-10-29
Payer: OTHER GOVERNMENT

## 2024-02-15 ENCOUNTER — APPOINTMENT (RX ONLY)
Dept: URBAN - METROPOLITAN AREA CLINIC 22 | Facility: CLINIC | Age: 59
Setting detail: DERMATOLOGY
End: 2024-02-15

## 2024-02-15 DIAGNOSIS — L24 IRRITANT CONTACT DERMATITIS: ICD-10-CM

## 2024-02-15 DIAGNOSIS — L93.0 DISCOID LUPUS ERYTHEMATOSUS: ICD-10-CM

## 2024-02-15 PROBLEM — L30.9 DERMATITIS, UNSPECIFIED: Status: ACTIVE | Noted: 2024-02-15

## 2024-02-15 PROCEDURE — ? COUNSELING

## 2024-02-15 PROCEDURE — ? PRESCRIPTION

## 2024-02-15 PROCEDURE — 99214 OFFICE O/P EST MOD 30 MIN: CPT

## 2024-02-15 PROCEDURE — ? DIAGNOSIS COMMENT

## 2024-02-15 PROCEDURE — ? ADDITIONAL NOTES

## 2024-02-15 RX ORDER — TACROLIMUS 1 MG/G
OINTMENT TOPICAL BID
Qty: 60 | Refills: 2 | Status: ERX

## 2024-02-15 RX ORDER — TRIAMCINOLONE ACETONIDE 1 MG/G
OINTMENT TOPICAL BID
Qty: 80 | Refills: 3 | Status: ERX | COMMUNITY
Start: 2024-02-15

## 2024-02-15 RX ADMIN — TRIAMCINOLONE ACETONIDE: 1 OINTMENT TOPICAL at 00:00

## 2024-02-15 ASSESSMENT — LOCATION SIMPLE DESCRIPTION DERM
LOCATION SIMPLE: LOWER BACK
LOCATION SIMPLE: RIGHT CHEEK
LOCATION SIMPLE: LEFT SCALP

## 2024-02-15 ASSESSMENT — LOCATION ZONE DERM
LOCATION ZONE: FACE
LOCATION ZONE: TRUNK
LOCATION ZONE: SCALP

## 2024-02-15 ASSESSMENT — LOCATION DETAILED DESCRIPTION DERM
LOCATION DETAILED: RIGHT CENTRAL MALAR CHEEK
LOCATION DETAILED: SUPERIOR LUMBAR SPINE
LOCATION DETAILED: LEFT MEDIAL FRONTAL SCALP

## 2024-02-15 NOTE — PROCEDURE: ADDITIONAL NOTES
Detail Level: Simple
Additional Notes: 2/15/24- pt has been doing better and is stable. She sees Rheum and is on hydroxychloroquine. She is not very diligent on using her sunscreen, she is flaring on her cheek.  She can use triamcinolone as rx for her back rash today for 2-3 days BID for initial control and will switch to her tacrolimus \\n\\nCurrently flared on cheeks today. \\nPatient admits she is not as consistent with the tacrolimus.  She feels this works better than the topical CSs.  \\nNo current issues on scalp and hair has grown back. \\nIs also on hydroxychloroquine.   Rheum is now Dr. Avendaño.
Render Risk Assessment In Note?: no

## 2024-07-24 ENCOUNTER — APPOINTMENT (RX ONLY)
Dept: URBAN - METROPOLITAN AREA CLINIC 22 | Facility: CLINIC | Age: 59
Setting detail: DERMATOLOGY
End: 2024-07-24

## 2024-07-24 DIAGNOSIS — Q819 OTHER SPECIFIED ANOMALIES OF SKIN: ICD-10-CM

## 2024-07-24 DIAGNOSIS — D22 MELANOCYTIC NEVI: ICD-10-CM

## 2024-07-24 DIAGNOSIS — L93.0 DISCOID LUPUS ERYTHEMATOSUS: ICD-10-CM

## 2024-07-24 DIAGNOSIS — Q828 OTHER SPECIFIED ANOMALIES OF SKIN: ICD-10-CM

## 2024-07-24 DIAGNOSIS — Q826 OTHER SPECIFIED ANOMALIES OF SKIN: ICD-10-CM

## 2024-07-24 DIAGNOSIS — D18.0 HEMANGIOMA: ICD-10-CM

## 2024-07-24 DIAGNOSIS — Z71.89 OTHER SPECIFIED COUNSELING: ICD-10-CM

## 2024-07-24 DIAGNOSIS — L81.4 OTHER MELANIN HYPERPIGMENTATION: ICD-10-CM

## 2024-07-24 DIAGNOSIS — L82.1 OTHER SEBORRHEIC KERATOSIS: ICD-10-CM

## 2024-07-24 PROBLEM — D22.5 MELANOCYTIC NEVI OF TRUNK: Status: ACTIVE | Noted: 2024-07-24

## 2024-07-24 PROBLEM — D18.01 HEMANGIOMA OF SKIN AND SUBCUTANEOUS TISSUE: Status: ACTIVE | Noted: 2024-07-24

## 2024-07-24 PROBLEM — L85.8 OTHER SPECIFIED EPIDERMAL THICKENING: Status: ACTIVE | Noted: 2024-07-24

## 2024-07-24 PROCEDURE — ? ADDITIONAL NOTES

## 2024-07-24 PROCEDURE — 99214 OFFICE O/P EST MOD 30 MIN: CPT

## 2024-07-24 PROCEDURE — ? RECOMMENDATIONS

## 2024-07-24 PROCEDURE — ? SUNSCREEN RECOMMENDATIONS

## 2024-07-24 PROCEDURE — ? PRESCRIPTION

## 2024-07-24 PROCEDURE — ? COUNSELING

## 2024-07-24 PROCEDURE — ? DIAGNOSIS COMMENT

## 2024-07-24 RX ORDER — TACROLIMUS 1 MG/G
OINTMENT TOPICAL BID
Qty: 60 | Refills: 2 | Status: ERX

## 2024-07-24 ASSESSMENT — LOCATION SIMPLE DESCRIPTION DERM
LOCATION SIMPLE: ABDOMEN
LOCATION SIMPLE: LEFT THIGH
LOCATION SIMPLE: RIGHT UPPER BACK
LOCATION SIMPLE: LEFT UPPER BACK
LOCATION SIMPLE: LEFT SCALP
LOCATION SIMPLE: RIGHT BUTTOCK
LOCATION SIMPLE: RIGHT CHEEK
LOCATION SIMPLE: LEFT FOREARM

## 2024-07-24 ASSESSMENT — LOCATION DETAILED DESCRIPTION DERM
LOCATION DETAILED: LEFT PROXIMAL DORSAL FOREARM
LOCATION DETAILED: LEFT INFERIOR UPPER BACK
LOCATION DETAILED: LEFT ANTERIOR PROXIMAL THIGH
LOCATION DETAILED: RIGHT BUTTOCK
LOCATION DETAILED: LEFT MEDIAL FRONTAL SCALP
LOCATION DETAILED: LEFT LATERAL ABDOMEN
LOCATION DETAILED: RIGHT MID-UPPER BACK
LOCATION DETAILED: RIGHT CENTRAL MALAR CHEEK

## 2024-07-24 ASSESSMENT — LOCATION ZONE DERM
LOCATION ZONE: FACE
LOCATION ZONE: SCALP
LOCATION ZONE: ARM
LOCATION ZONE: LEG
LOCATION ZONE: TRUNK

## 2024-07-24 NOTE — PROCEDURE: ADDITIONAL NOTES
Detail Level: Simple
Additional Notes: 7/24/24- Pt admits she went off her hydroxychloroquine when she went to [place] and has been off for 3 or so weeks.   She does have small flare on scalp currently and restarted medication a few days ago. She requests refill of her tacrolimus, face is stable.  Will use triamcinolone on scalp as well prn.  She is managed by PCP and Rheumatologist w/regards to refills and lab monitoring.  \\n\\n\\n2/15/24- pt has been doing better and is stable. She sees Rheum and is on hydroxychloroquine. She is not very diligent on using her sunscreen, she is flaring on her cheek.  She can use triamcinolone as rx for her back rash today for 2-3 days BID for initial control and will switch to her tacrolimus
Render Risk Assessment In Note?: no

## 2024-07-24 NOTE — PROCEDURE: MIPS QUALITY
Mild, no treatment.
Quality 431: Preventive Care And Screening: Unhealthy Alcohol Use - Screening: Patient not identified as an unhealthy alcohol user when screened for unhealthy alcohol use using a systematic screening method
Quality 226: Preventive Care And Screening: Tobacco Use: Screening And Cessation Intervention: Patient screened for tobacco use and is an ex/non-smoker
Detail Level: Detailed

## 2024-07-24 NOTE — PROCEDURE: RECOMMENDATIONS
Render Risk Assessment In Note?: no
Detail Level: Zone
Recommendations (Free Text): CeraVe Rough and Bumpy

## 2025-03-05 ENCOUNTER — APPOINTMENT (OUTPATIENT)
Dept: URBAN - METROPOLITAN AREA CLINIC 22 | Facility: CLINIC | Age: 60
Setting detail: DERMATOLOGY
End: 2025-03-05

## 2025-03-05 DIAGNOSIS — L93.0 DISCOID LUPUS ERYTHEMATOSUS: ICD-10-CM

## 2025-03-05 PROCEDURE — ? DIAGNOSIS COMMENT

## 2025-03-05 PROCEDURE — 11900 INJECT SKIN LESIONS </W 7: CPT

## 2025-03-05 PROCEDURE — ? INTRALESIONAL KENALOG

## 2025-03-05 PROCEDURE — ? PRESCRIPTION

## 2025-03-05 PROCEDURE — ? ADDITIONAL NOTES

## 2025-03-05 PROCEDURE — ? COUNSELING

## 2025-03-05 RX ORDER — CLOBETASOL PROPIONATE 0.5 MG/G
GEL TOPICAL BID
Qty: 30 | Refills: 2 | Status: ERX | COMMUNITY
Start: 2025-03-05

## 2025-03-05 RX ADMIN — CLOBETASOL PROPIONATE: 0.5 GEL TOPICAL at 00:00

## 2025-03-05 ASSESSMENT — LOCATION ZONE DERM
LOCATION ZONE: FACE
LOCATION ZONE: SCALP

## 2025-03-05 ASSESSMENT — LOCATION DETAILED DESCRIPTION DERM
LOCATION DETAILED: LEFT CENTRAL FRONTAL SCALP
LOCATION DETAILED: LEFT MEDIAL FRONTAL SCALP
LOCATION DETAILED: RIGHT CENTRAL MALAR CHEEK

## 2025-03-05 ASSESSMENT — LOCATION SIMPLE DESCRIPTION DERM
LOCATION SIMPLE: LEFT SCALP
LOCATION SIMPLE: RIGHT CHEEK

## 2025-03-05 NOTE — PROCEDURE: ADDITIONAL NOTES
Detail Level: Simple
Additional Notes: 3/5/25: pt having flare on scalp. She admits not being persistent with wearing a hat. She’s also under a lot of stress due to her dad undergoing Alzheimer’s diagnosis. \\nDiscussed possibly using stronger topical steroid.\\n\\n7/24/24- Pt admits she went off her hydroxychloroquine when she went to [place] and has been off for 3 or so weeks.   She does have small flare on scalp currently and restarted medication a few days ago. She requests refill of her tacrolimus, face is stable.  Will use triamcinolone on scalp as well prn.  She is managed by PCP and Rheumatologist w/regards to refills and lab monitoring.  \\n\\n\\n2/15/24- pt has been doing better and is stable. She sees Rheum and is on hydroxychloroquine. She is not very diligent on using her sunscreen, she is flaring on her cheek.  She can use triamcinolone as rx for her back rash today for 2-3 days BID for initial control and will switch to her tacrolimus
Render Risk Assessment In Note?: no

## 2025-03-05 NOTE — PROCEDURE: INTRALESIONAL KENALOG
Require Ndc Code?: No
Concentration Of Kenalog Solution Injected (Mg/Ml): 5.0
Total Volume (Ccs): 2.0
How Many Mls Were Removed From The 80 Mg/Ml (5ml) Vial When Preparing The Injectable Solution?: 0
Lot # For Kenalog (Optional): [id number]
Administered By (Optional): Giana Nava PA-C
Detail Level: Detailed
Medical Necessity Clause: This procedure was medically necessary because the lesions that were treated were:
Consent: The risks of atrophy were reviewed with the patient.
Expiration Date For Kenalog (Optional): APR 2026
Kenalog Preparation: Kenalog with 1% lidocaine without epinephrine
Kenalog Type Of Vial: Multiple Dose
Validate Note Data When Using Inventory: Yes

## 2025-04-24 ENCOUNTER — APPOINTMENT (OUTPATIENT)
Dept: URBAN - METROPOLITAN AREA CLINIC 22 | Facility: CLINIC | Age: 60
Setting detail: DERMATOLOGY
End: 2025-04-24

## 2025-04-24 DIAGNOSIS — L93.0 DISCOID LUPUS ERYTHEMATOSUS: ICD-10-CM

## 2025-04-24 DIAGNOSIS — L60.3 NAIL DYSTROPHY: ICD-10-CM

## 2025-04-24 PROCEDURE — ? COUNSELING

## 2025-04-24 PROCEDURE — ? DIAGNOSIS COMMENT

## 2025-04-24 PROCEDURE — ? ADDITIONAL NOTES

## 2025-04-24 PROCEDURE — 99214 OFFICE O/P EST MOD 30 MIN: CPT

## 2025-04-24 PROCEDURE — ? PRESCRIPTION

## 2025-04-24 RX ORDER — PIMECROLIMUS 10 MG/G
CREAM TOPICAL QD
Qty: 60 | Refills: 1 | Status: ERX | COMMUNITY
Start: 2025-04-24

## 2025-04-24 RX ADMIN — PIMECROLIMUS: 10 CREAM TOPICAL at 00:00

## 2025-04-24 ASSESSMENT — LOCATION SIMPLE DESCRIPTION DERM
LOCATION SIMPLE: LEFT SCALP
LOCATION SIMPLE: LEFT THUMBNAIL
LOCATION SIMPLE: RIGHT CHEEK

## 2025-04-24 ASSESSMENT — LOCATION ZONE DERM
LOCATION ZONE: FACE
LOCATION ZONE: SCALP
LOCATION ZONE: FINGERNAIL

## 2025-04-24 ASSESSMENT — LOCATION DETAILED DESCRIPTION DERM
LOCATION DETAILED: RIGHT CENTRAL MALAR CHEEK
LOCATION DETAILED: LEFT MEDIAL FRONTAL SCALP
LOCATION DETAILED: LEFT THUMBNAIL

## 2025-04-24 NOTE — PROCEDURE: ADDITIONAL NOTES
Detail Level: Simple
Additional Notes: 4/24/25: pt inquired about tacrolimus solution as tacrolimus ointment is too greasy.\\nAdvised no solution available, but she can switch to a cream.\\nPt interested in trialing pimecrolimus cream. \\n\\n3/5/25: pt having flare on scalp. She admits not being persistent with wearing a hat. She’s also under a lot of stress due to her dad undergoing Alzheimer’s diagnosis. \\nDiscussed possibly using stronger topical steroid.\\n\\n7/24/24- Pt admits she went off her hydroxychloroquine when she went to [place] and has been off for 3 or so weeks.   She does have small flare on scalp currently and restarted medication a few days ago. She requests refill of her tacrolimus, face is stable.  Will use triamcinolone on scalp as well prn.  She is managed by PCP and Rheumatologist w/regards to refills and lab monitoring.  \\n\\n\\n2/15/24- pt has been doing better and is stable. She sees Rheum and is on hydroxychloroquine. She is not very diligent on using her sunscreen, she is flaring on her cheek.  She can use triamcinolone as rx for her back rash today for 2-3 days BID for initial control and will switch to her tacrolimus
Render Risk Assessment In Note?: no
Additional Notes: Pt noticed nail changes x 1 month. She may have bumped nail. \\nNo fungal infection currently suspected, some ridging.\\nDiscussed trimming nail so she can avoid accidental bumping.\\nIf nail changes continue may consider sending nail trim for testing. \\nF/u in July.

## 2025-06-17 NOTE — PROCEDURE: MIPS QUALITY
Quality 110: Preventive Care And Screening: Influenza Immunization: Influenza Immunization previously received during influenza season
Quality 431: Preventive Care And Screening: Unhealthy Alcohol Use - Screening: Patient screened for unhealthy alcohol use using a single question and scores less than 2 times per year
Detail Level: Detailed
no

## 2025-07-23 ENCOUNTER — APPOINTMENT (OUTPATIENT)
Dept: RADIOLOGY | Facility: MEDICAL CENTER | Age: 60
End: 2025-07-23
Attending: FAMILY MEDICINE
Payer: OTHER GOVERNMENT

## 2025-07-23 DIAGNOSIS — Z86.73 PERSONAL HISTORY OF TIA (TRANSIENT ISCHEMIC ATTACK): ICD-10-CM

## 2025-07-23 PROCEDURE — 70551 MRI BRAIN STEM W/O DYE: CPT

## 2025-07-24 ENCOUNTER — APPOINTMENT (OUTPATIENT)
Dept: URBAN - METROPOLITAN AREA CLINIC 22 | Facility: CLINIC | Age: 60
Setting detail: DERMATOLOGY
End: 2025-07-24

## 2025-07-24 DIAGNOSIS — D18.0 HEMANGIOMA: ICD-10-CM

## 2025-07-24 DIAGNOSIS — L93.0 DISCOID LUPUS ERYTHEMATOSUS: ICD-10-CM

## 2025-07-24 DIAGNOSIS — L82.1 OTHER SEBORRHEIC KERATOSIS: ICD-10-CM

## 2025-07-24 DIAGNOSIS — Z71.89 OTHER SPECIFIED COUNSELING: ICD-10-CM

## 2025-07-24 DIAGNOSIS — D22 MELANOCYTIC NEVI: ICD-10-CM

## 2025-07-24 DIAGNOSIS — L81.4 OTHER MELANIN HYPERPIGMENTATION: ICD-10-CM

## 2025-07-24 PROBLEM — D22.5 MELANOCYTIC NEVI OF TRUNK: Status: ACTIVE | Noted: 2025-07-24

## 2025-07-24 PROBLEM — D18.01 HEMANGIOMA OF SKIN AND SUBCUTANEOUS TISSUE: Status: ACTIVE | Noted: 2025-07-24

## 2025-07-24 PROCEDURE — ? PRESCRIPTION

## 2025-07-24 PROCEDURE — ? DIAGNOSIS COMMENT

## 2025-07-24 PROCEDURE — ? COUNSELING

## 2025-07-24 PROCEDURE — ? SUNSCREEN RECOMMENDATIONS

## 2025-07-24 PROCEDURE — ? ADDITIONAL NOTES

## 2025-07-24 RX ORDER — CLOBETASOL PROPIONATE 0.5 MG/ML
SOLUTION TOPICAL BID
Qty: 50 | Refills: 4 | Status: ERX | COMMUNITY
Start: 2025-07-24

## 2025-07-24 RX ADMIN — CLOBETASOL PROPIONATE: 0.5 SOLUTION TOPICAL at 00:00

## 2025-07-24 ASSESSMENT — LOCATION DETAILED DESCRIPTION DERM
LOCATION DETAILED: LEFT LATERAL ABDOMEN
LOCATION DETAILED: LEFT ANTERIOR PROXIMAL THIGH
LOCATION DETAILED: RIGHT MID-UPPER BACK
LOCATION DETAILED: LEFT PROXIMAL DORSAL FOREARM
LOCATION DETAILED: LEFT MEDIAL FRONTAL SCALP
LOCATION DETAILED: RIGHT CENTRAL MALAR CHEEK
LOCATION DETAILED: LEFT INFERIOR UPPER BACK

## 2025-07-24 ASSESSMENT — LOCATION SIMPLE DESCRIPTION DERM
LOCATION SIMPLE: ABDOMEN
LOCATION SIMPLE: LEFT SCALP
LOCATION SIMPLE: LEFT FOREARM
LOCATION SIMPLE: LEFT THIGH
LOCATION SIMPLE: RIGHT UPPER BACK
LOCATION SIMPLE: LEFT UPPER BACK
LOCATION SIMPLE: RIGHT CHEEK

## 2025-07-24 ASSESSMENT — LOCATION ZONE DERM
LOCATION ZONE: SCALP
LOCATION ZONE: TRUNK
LOCATION ZONE: LEG
LOCATION ZONE: FACE
LOCATION ZONE: ARM

## 2025-07-24 ASSESSMENT — PAIN INTENSITY VAS: HOW INTENSE IS YOUR PAIN 0 BEING NO PAIN, 10 BEING THE MOST SEVERE PAIN POSSIBLE?: NO PAIN

## 2025-07-24 ASSESSMENT — BSA RASH: BSA RASH: 1

## 2025-07-24 ASSESSMENT — SEVERITY ASSESSMENT: SEVERITY: ALMOST CLEAR

## 2025-07-24 NOTE — PROCEDURE: ADDITIONAL NOTES
Detail Level: Simple
Additional Notes: 7/24/25: pt has singular lesion on scalp. Discussed using topical steroid to help reduce.\\nShe would like topical solution vs gel.\\n\\n4/24/25: pt inquired about tacrolimus solution as tacrolimus ointment is too greasy.\\nAdvised no solution available, but she can switch to a cream.\\nPt interested in trialing pimecrolimus cream. \\n\\n3/5/25: pt having flare on scalp. She admits not being persistent with wearing a hat. She’s also under a lot of stress due to her dad undergoing Alzheimer’s diagnosis. \\nDiscussed possibly using stronger topical steroid.\\n\\n7/24/24- Pt admits she went off her hydroxychloroquine when she went to [place] and has been off for 3 or so weeks.   She does have small flare on scalp currently and restarted medication a few days ago. She requests refill of her tacrolimus, face is stable.  Will use triamcinolone on scalp as well prn.  She is managed by PCP and Rheumatologist w/regards to refills and lab monitoring.  \\n\\n\\n2/15/24- pt has been doing better and is stable. She sees Rheum and is on hydroxychloroquine. She is not very diligent on using her sunscreen, she is flaring on her cheek.  She can use triamcinolone as rx for her back rash today for 2-3 days BID for initial control and will switch to her tacrolimus
Render Risk Assessment In Note?: no

## 2025-08-19 ENCOUNTER — OFFICE VISIT (OUTPATIENT)
Dept: OBGYN | Facility: CLINIC | Age: 60
End: 2025-08-19
Payer: OTHER GOVERNMENT

## 2025-08-19 VITALS
SYSTOLIC BLOOD PRESSURE: 94 MMHG | HEIGHT: 62 IN | BODY MASS INDEX: 22.12 KG/M2 | DIASTOLIC BLOOD PRESSURE: 65 MMHG | WEIGHT: 120.2 LBS

## 2025-08-19 DIAGNOSIS — Z01.419 WOMEN'S ANNUAL ROUTINE GYNECOLOGICAL EXAMINATION: Primary | ICD-10-CM

## 2025-08-19 DIAGNOSIS — N95.8 GENITOURINARY SYNDROME OF MENOPAUSE: ICD-10-CM

## 2025-08-19 DIAGNOSIS — N81.11 CYSTOCELE, MIDLINE: ICD-10-CM

## 2025-08-19 RX ORDER — ALPRAZOLAM 0.5 MG
0.25 TABLET ORAL NIGHTLY PRN
COMMUNITY

## 2025-08-19 RX ORDER — COLESTIPOL HYDROCHLORIDE 1 G/1
1 TABLET ORAL 2 TIMES DAILY
COMMUNITY

## 2025-08-19 RX ORDER — ESTRADIOL 10 UG/1
TABLET, FILM COATED VAGINAL
Qty: 193 TABLET | Refills: 3 | Status: SHIPPED | OUTPATIENT
Start: 2025-08-19

## 2025-08-19 ASSESSMENT — LIFESTYLE VARIABLES: HOW OFTEN DO YOU HAVE A DRINK CONTAINING ALCOHOL: 2-3 TIMES A WEEK
